# Patient Record
Sex: MALE | Race: WHITE | NOT HISPANIC OR LATINO | Employment: FULL TIME | URBAN - METROPOLITAN AREA
[De-identification: names, ages, dates, MRNs, and addresses within clinical notes are randomized per-mention and may not be internally consistent; named-entity substitution may affect disease eponyms.]

---

## 2017-01-03 ENCOUNTER — TELEPHONE (OUTPATIENT)
Dept: INTERNAL MEDICINE | Age: 62
End: 2017-01-03

## 2017-01-03 DIAGNOSIS — I10 ESSENTIAL HYPERTENSION: ICD-10-CM

## 2017-01-03 RX ORDER — AMLODIPINE BESYLATE 10 MG/1
10 TABLET ORAL DAILY
Qty: 30 TABLET | Refills: 2 | Status: SHIPPED | OUTPATIENT
Start: 2017-01-03 | End: 2017-03-29 | Stop reason: SDUPTHER

## 2017-01-03 NOTE — TELEPHONE ENCOUNTER
Rx sent to pharmacy. Pt needs to call pharmacy in future; the pharmacy can e-scribe the Rx request.    MALCOLM

## 2017-03-20 ENCOUNTER — OFFICE VISIT (OUTPATIENT)
Dept: INTERNAL MEDICINE | Age: 62
End: 2017-03-20

## 2017-03-20 VITALS
BODY MASS INDEX: 30.6 KG/M2 | OXYGEN SATURATION: 97 % | WEIGHT: 238.4 LBS | SYSTOLIC BLOOD PRESSURE: 128 MMHG | HEART RATE: 74 BPM | DIASTOLIC BLOOD PRESSURE: 68 MMHG | HEIGHT: 74 IN | TEMPERATURE: 97.7 F

## 2017-03-20 DIAGNOSIS — E66.9 OBESITY (BMI 30-39.9): ICD-10-CM

## 2017-03-20 DIAGNOSIS — I10 ESSENTIAL HYPERTENSION: Primary | ICD-10-CM

## 2017-03-20 PROCEDURE — 99213 OFFICE O/P EST LOW 20 MIN: CPT | Performed by: INTERNAL MEDICINE

## 2017-03-20 NOTE — PROGRESS NOTES
"Miles Pierre / 61 y.o. / male  03/20/2017    VITALS    Visit Vitals   • /68   • Pulse 74   • Temp 97.7 °F (36.5 °C)   • Ht 74\" (188 cm)   • Wt 238 lb 6.4 oz (108 kg)   • SpO2 97%   • BMI 30.61 kg/m2     BP Readings from Last 3 Encounters:   03/20/17 128/68   12/14/16 140/86   12/01/16 (!) 184/98     Wt Readings from Last 3 Encounters:   03/20/17 238 lb 6.4 oz (108 kg)   12/14/16 242 lb 8 oz (110 kg)   12/01/16 240 lb (109 kg)      Body mass index is 30.61 kg/(m^2).    CC:  Main reason(s) for today's visit: Hypertension      HPI:     Patient presents for four-month follow-up.  Last seen by me during November of last year for his annual physical.  Laboratory results are normal and are noted below.    Hypertension: He is compliant with medication,  salt irene, does not regularly monitor blood pressure the outpatient setting and does walk on a fairly regular basis 2-3 miles 4-5 times per week.    Weight control: Patient has lost 4 pounds since our last visit.    ______________________________________________________    ASSESSMENT & PLAN:    1. Essential hypertension    2. Obesity (BMI 30-39.9)      No orders of the defined types were placed in this encounter.      Summary/Discussion:     · 1 hypertension stable on current medical regimen.  Plan: Same meds, blood pressure check 4 months.  ·   · #2 weight control, needs improvement, agree with patient's walking plans now that the weather as moderate and  time change has occurred.      Return in about 4 months (around 7/20/2017) for Blood pressure check.    Future Appointments  Date Time Provider Department Center   7/24/2017 1:40 PM MD [facility] None       ______________________________________________________    REVIEW OF SYSTEMS    Review of Systems   Constitutional: Negative for appetite change, chills, diaphoresis, fatigue and fever.   HENT: Negative for hearing loss and trouble swallowing.    Eyes: Negative for visual " disturbance.   Respiratory: Negative for cough, chest tightness, shortness of breath and wheezing.    Cardiovascular: Negative for chest pain, palpitations and leg swelling.   Gastrointestinal: Negative for abdominal pain, constipation, diarrhea, nausea and vomiting.   Endocrine: Negative for cold intolerance, heat intolerance, polydipsia, polyphagia and polyuria.   Genitourinary: Negative for dysuria, frequency and hematuria.   Musculoskeletal: Negative for joint swelling and myalgias.   Skin: Negative for color change and rash.   Allergic/Immunologic: Negative for immunocompromised state.   Neurological: Negative for dizziness, syncope, speech difficulty, weakness, light-headedness, numbness and headaches.   Hematological: Negative for adenopathy. Does not bruise/bleed easily.   Psychiatric/Behavioral: Negative.          PHYSICAL EXAMINATION    Physical Exam   Constitutional: He is oriented to person, place, and time. He appears well-developed and well-nourished. No distress.   Cardiovascular: Normal rate, regular rhythm, normal heart sounds and intact distal pulses.  Exam reveals no gallop and no friction rub.    No murmur heard.  Pulmonary/Chest: Effort normal and breath sounds normal. He has no wheezes. He has no rales.   Musculoskeletal: He exhibits no edema.   Neurological: He is alert and oriented to person, place, and time.   Skin: Skin is warm and dry. No rash noted.   Psychiatric: He has a normal mood and affect. His behavior is normal. Judgment and thought content normal.   Nursing note and vitals reviewed.      REVIEWED DATA:    Labs:   Lab Results   Component Value Date     12/14/2016    K 4.9 12/14/2016    AST 65 (H) 12/14/2016    ALT 55 (H) 12/14/2016    BUN 15 12/14/2016    CREATININE 0.80 12/14/2016    CREATININE 0.66 (L) 12/01/2016    CREATININE 0.87 09/04/2015    EGFRIFNONA 96 12/14/2016    EGFRIFAFRI 111 12/14/2016       Lab Results   Component Value Date     (H) 12/14/2016    GLU  87 12/01/2016     (H) 09/04/2015    HGBA1C 5.5 09/04/2015       Lab Results   Component Value Date     (H) 12/14/2016    HDL 55 12/14/2016    TRIG 103 12/14/2016       No results found for: TSH, FREET4       Lab Results   Component Value Date    WBC 5.8 12/14/2016    HGB 15.0 12/14/2016     12/14/2016        Imaging:        Medical Tests:        Summary of old records / correspondence / consultant report:        Request outside records:          ALLERGIES:    Review of patient's allergies indicates no known allergies.    MEDICATIONS:       Current Outpatient Prescriptions   Medication Sig Dispense Refill   • amLODIPine (NORVASC) 10 MG tablet Take 1 tablet by mouth Daily. 30 tablet 2     No current facility-administered medications for this visit.        Count [facility] the [facility]    The following portions of the patient's history were reviewed and updated as appropriate: Allergies / Current Medications / Past Medical History / Surgical History / Social History / Family History    PROBLEM LIST:    Patient Active Problem List   Diagnosis   • Impaired fasting glucose   • Hypertension   • Hyperlipidemia   • Impotence of organic origin   • Osteoarthritis of knee   • Tear of medial meniscus of knee   • Obesity (BMI 30-39.9)   • Routine health maintenance   • Nocturia   • Abnormal laboratory test       PAST MEDICAL HX:    Past Medical History   Diagnosis Date   • ED (erectile dysfunction)    • Hyperlipidemia    • Hypertension        PAST SURGICAL HX:    Past Surgical History   Procedure Laterality Date   • No past surgeries         SOCIAL HX:    Social History     Social History   • Marital status: Single     Spouse name: N/A   • Number of children: N/A   • Years of education: N/A     Occupational History   • 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      Social History Main Topics   • Smoking status: Former Smoker     Packs/day: 0.50     Years: 23.00     Types: Cigars     Start date: 1968     Quit date: 1991   • Smokeless  tobacco: Never Used   • Alcohol use No   • Drug use: None   • Sexual activity: No     Other Topics Concern   • None     Social History Narrative    Single       FAMILY HX:    Family History   Problem Relation Age of Onset   • Hypertension Father    • Hypertension Brother          **Moose Disclaimer:   Much of this encounter note is an electronic transcription/translation of spoken language to printed text. The electronic translation of spoken language may permit erroneous, or at times, nonsensical words or phrases to be inadvertently transcribed. Although I have reviewed the note for such errors, some may still exist.

## 2017-03-29 DIAGNOSIS — I10 ESSENTIAL HYPERTENSION: ICD-10-CM

## 2017-03-29 RX ORDER — AMLODIPINE BESYLATE 10 MG/1
TABLET ORAL
Qty: 30 TABLET | Refills: 5 | Status: SHIPPED | OUTPATIENT
Start: 2017-03-29 | End: 2017-08-02 | Stop reason: SDUPTHER

## 2017-07-24 ENCOUNTER — OFFICE VISIT (OUTPATIENT)
Dept: INTERNAL MEDICINE | Age: 62
End: 2017-07-24

## 2017-07-24 VITALS
OXYGEN SATURATION: 96 % | WEIGHT: 231.4 LBS | HEIGHT: 74 IN | SYSTOLIC BLOOD PRESSURE: 128 MMHG | DIASTOLIC BLOOD PRESSURE: 68 MMHG | HEART RATE: 66 BPM | TEMPERATURE: 98.6 F | BODY MASS INDEX: 29.7 KG/M2

## 2017-07-24 DIAGNOSIS — I10 ESSENTIAL HYPERTENSION: Primary | ICD-10-CM

## 2017-07-24 DIAGNOSIS — R73.01 IMPAIRED FASTING GLUCOSE: ICD-10-CM

## 2017-07-24 DIAGNOSIS — G47.30 SLEEP APNEA, UNSPECIFIED TYPE: ICD-10-CM

## 2017-07-24 DIAGNOSIS — E66.9 OBESITY (BMI 30-39.9): ICD-10-CM

## 2017-07-24 PROCEDURE — 99214 OFFICE O/P EST MOD 30 MIN: CPT | Performed by: INTERNAL MEDICINE

## 2017-07-24 NOTE — PROGRESS NOTES
"Miles Pierre / 61 y.o. / male  07/24/2017  VITALS    /68  Pulse 66  Temp 98.6 °F (37 °C)  Ht 74\" (188 cm)  Wt 231 lb 6.4 oz (105 kg)  SpO2 96%  BMI 29.71 kg/m2  BP Readings from Last 3 Encounters:   07/24/17 128/68   03/20/17 128/68   12/14/16 140/86     Wt Readings from Last 3 Encounters:   07/24/17 231 lb 6.4 oz (105 kg)   03/20/17 238 lb 6.4 oz (108 kg)   12/14/16 242 lb 8 oz (110 kg)      Body mass index is 29.71 kg/(m^2).    CC:  Main reason(s) for today's visit: Hypertension      HPI:     Patient presents today for follow-up of several medical issues.  His last seen by me approximately 4 months ago which time his blood pressure is well-controlled at 120/68.    Today he is compliant with medication, regular physical activity, home blood pressure monitoring (blood pressure typically runs in the 140s over 70s range), and he does not regularly salt his food.  There are no medication side effects noted.    Weight control: Patient is lost 7 pounds since our last visit.    Impaired glucose tolerance was noted last lab test, he is not fasting today but he can suggest an A1c which will tell us the level of his blood sugar control is a 10 weeks.    In addition to this he notes that he feels\"disjointed\" over the past month or so.  He also acknowledges some daytime sedation, occasional a.m. headache, he has been noted to snore during the evening, he also notes some daytime sedation in boring meetings.  He acknowledges that he feels moderately rested upon awakening.    Patient Care Team:  Cliff Parekh MD as PCP - General (Internal Medicine)    ____________________________________________________________________    ASSESSMENT & PLAN:    Problem List Items Addressed This Visit        Unprioritized    Impaired fasting glucose    Relevant Orders    Hemoglobin A1c    Hypertension - Primary    Relevant Medications    amLODIPine (NORVASC) 10 MG tablet    Obesity (BMI 30-39.9)      Other Visit Diagnoses     " Sleep apnea, unspecified type        Relevant Orders    Polysomnography 4 or More Parameters        Orders Placed This Encounter   Procedures   • Hemoglobin A1c   • Polysomnography 4 or More Parameters       Summary/Discussion:     · Number-one hypertension stable on current medical regimen.  Plan: Same meds, blood pressure check 4 months.  ·   · 2 weight control, encouraged patient's effort to continue with weight loss as above.  ·   · #3 history of impaired glucose tolerance, we will check A1c today.  ·   · #4 symptoms of malaise are consistent without sleep apnea.  We'll schedule polysomnography and treat accordingly.      Return in about 4 months (around 11/24/2017) for Blood pressure check.    No future appointments.    ______________________________________________________    REVIEW OF SYSTEMS    Review of Systems   Respiratory: Negative for chest tightness and shortness of breath.    Cardiovascular: Negative for chest pain and palpitations.   Neurological: Negative for dizziness, syncope, speech difficulty, weakness, light-headedness and headaches.         PHYSICAL EXAMINATION    Physical Exam   Constitutional: He is oriented to person, place, and time. He appears well-developed. No distress.   o/w   Cardiovascular: Normal rate, regular rhythm, normal heart sounds and intact distal pulses.  Exam reveals no gallop and no friction rub.    No murmur heard.  Pulmonary/Chest: Effort normal and breath sounds normal. He has no wheezes. He has no rales.   Musculoskeletal: He exhibits no edema.   Neurological: He is alert and oriented to person, place, and time.   Skin: Skin is warm and dry. No rash noted.   Psychiatric: He has a normal mood and affect. His behavior is normal. Judgment and thought content normal.   Nursing note and vitals reviewed.      REVIEWED DATA:    Labs:   Lab Results   Component Value Date     12/14/2016    K 4.9 12/14/2016    AST 65 (H) 12/14/2016    ALT 55 (H) 12/14/2016    BUN 15  12/14/2016    CREATININE 0.80 12/14/2016    CREATININE 0.66 (L) 12/01/2016    CREATININE 0.87 09/04/2015    EGFRIFNONA 96 12/14/2016    EGFRIFAFRI 111 12/14/2016          Lab Results   Component Value Date    HGBA1C 5.5 09/04/2015     (H) 12/14/2016    GLU 87 12/01/2016     (H) 09/04/2015       Lab Results   Component Value Date     (H) 12/14/2016    HDL 55 12/14/2016    TRIG 103 12/14/2016       No results found for: TSH, FREET4       Lab Results   Component Value Date    WBC 5.8 12/14/2016    HGB 15.0 12/14/2016     12/14/2016        Imaging:        Medical Tests:        Summary of old records / correspondence / consultant report:        Request outside records:        No Known Allergies    Current Outpatient Prescriptions   Medication Sig Dispense Refill   • amLODIPine (NORVASC) 10 MG tablet TAKE ONE TABLET BY MOUTH ONCE DAILY 30 tablet 5     No current facility-administered medications for this visit.        PFSH:     The following portions of the patient's history were reviewed and updated as appropriate: Allergies / Current Medications / Past Medical History / Surgical History / Social History / Family History    Patient Active Problem List   Diagnosis   • Impaired fasting glucose   • Hypertension   • Hyperlipidemia   • Impotence of organic origin   • Osteoarthritis of knee   • Tear of medial meniscus of knee   • Obesity (BMI 30-39.9)   • Routine health maintenance   • Nocturia   • Abnormal laboratory test       Past Medical History:   Diagnosis Date   • ED (erectile dysfunction)    • Hyperlipidemia    • Hypertension    • Skin cancer 05/2017    Basal cell carcinoma       Past Surgical History:   Procedure Laterality Date   • NO PAST SURGERIES     • SKIN CANCER EXCISION      basal cell       Social History     Social History   • Marital status: Single     Spouse name: N/A   • Number of children: 3   • Years of education: N/A     Occupational History   • [employer]  manufacturers rep  electrical      Social History Main Topics   • Smoking status: Former Smoker     Packs/day: 0.50     Years: 23.00     Types: Cigars     Start date: 1968     Quit date: 1991   • Smokeless tobacco: Never Used   • Alcohol use No   • Drug use: None   • Sexual activity: No     Other Topics Concern   • None     Social History Narrative    Single       Family History   Problem Relation Age of Onset   • Hypertension Father    • Hypertension Brother          **Moose Disclaimer:   Much of this encounter note is an electronic transcription/translation of spoken language to printed text. The electronic translation of spoken language may permit erroneous, or at times, nonsensical words or phrases to be inadvertently transcribed. Although I have reviewed the note for such errors, some may still exist.

## 2017-07-25 LAB — HBA1C MFR BLD: 6 % (ref 4.8–5.6)

## 2017-08-02 ENCOUNTER — TELEPHONE (OUTPATIENT)
Dept: INTERNAL MEDICINE | Age: 62
End: 2017-08-02

## 2017-08-02 DIAGNOSIS — I10 ESSENTIAL HYPERTENSION: ICD-10-CM

## 2017-08-02 RX ORDER — AMLODIPINE BESYLATE 10 MG/1
10 TABLET ORAL DAILY
Qty: 30 TABLET | Refills: 0 | Status: SHIPPED | OUTPATIENT
Start: 2017-08-02 | End: 2017-08-31 | Stop reason: SDUPTHER

## 2017-08-02 NOTE — TELEPHONE ENCOUNTER
P/pt took his last amlodipine 10mg pill yesterday-Tuesday 8/1/17 & is now OUT. Pt is traveling currently & has requested a 30 day rx refill of amlodipine 10mg to be called into [facility] in [place], [place] #[phone].    Pt can be reached #[phone].

## 2017-08-30 ENCOUNTER — OFFICE VISIT (OUTPATIENT)
Dept: INTERNAL MEDICINE | Age: 62
End: 2017-08-30

## 2017-08-30 VITALS
HEART RATE: 92 BPM | SYSTOLIC BLOOD PRESSURE: 158 MMHG | WEIGHT: 232 LBS | BODY MASS INDEX: 29.77 KG/M2 | HEIGHT: 74 IN | TEMPERATURE: 98 F | OXYGEN SATURATION: 99 % | DIASTOLIC BLOOD PRESSURE: 84 MMHG

## 2017-08-30 DIAGNOSIS — R53.83 FATIGUE, UNSPECIFIED TYPE: Primary | ICD-10-CM

## 2017-08-30 DIAGNOSIS — R74.8 ELEVATED LIVER ENZYMES: ICD-10-CM

## 2017-08-30 DIAGNOSIS — I10 ESSENTIAL HYPERTENSION: ICD-10-CM

## 2017-08-30 LAB
ALBUMIN SERPL-MCNC: 4.8 G/DL (ref 3.5–5.2)
ALBUMIN/GLOB SERPL: 1.7 G/DL
ALP SERPL-CCNC: 102 U/L (ref 39–117)
ALT SERPL-CCNC: 45 U/L (ref 1–41)
AST SERPL-CCNC: 49 U/L (ref 1–40)
BASOPHILS # BLD AUTO: 0.01 10*3/MM3 (ref 0–0.2)
BASOPHILS NFR BLD AUTO: 0.2 % (ref 0–1.5)
BILIRUB SERPL-MCNC: 1.6 MG/DL (ref 0.1–1.2)
BUN SERPL-MCNC: 16 MG/DL (ref 8–23)
BUN/CREAT SERPL: 19.3 (ref 7–25)
CALCIUM SERPL-MCNC: 9.6 MG/DL (ref 8.6–10.5)
CHLORIDE SERPL-SCNC: 103 MMOL/L (ref 98–107)
CO2 SERPL-SCNC: 27.2 MMOL/L (ref 22–29)
CREAT SERPL-MCNC: 0.83 MG/DL (ref 0.76–1.27)
EOSINOPHIL # BLD AUTO: 0.1 10*3/MM3 (ref 0–0.7)
EOSINOPHIL NFR BLD AUTO: 2 % (ref 0.3–6.2)
ERYTHROCYTE [DISTWIDTH] IN BLOOD BY AUTOMATED COUNT: 13.3 % (ref 11.5–14.5)
GLOBULIN SER CALC-MCNC: 2.8 GM/DL
GLUCOSE SERPL-MCNC: 154 MG/DL (ref 65–99)
HCT VFR BLD AUTO: 43.4 % (ref 40.4–52.2)
HGB BLD-MCNC: 14 G/DL (ref 13.7–17.6)
IMM GRANULOCYTES # BLD: 0 10*3/MM3 (ref 0–0.03)
IMM GRANULOCYTES NFR BLD: 0 % (ref 0–0.5)
LYMPHOCYTES # BLD AUTO: 1.62 10*3/MM3 (ref 0.9–4.8)
LYMPHOCYTES NFR BLD AUTO: 32.1 % (ref 19.6–45.3)
MCH RBC QN AUTO: 29.9 PG (ref 27–32.7)
MCHC RBC AUTO-ENTMCNC: 32.3 G/DL (ref 32.6–36.4)
MCV RBC AUTO: 92.5 FL (ref 79.8–96.2)
MONOCYTES # BLD AUTO: 0.35 10*3/MM3 (ref 0.2–1.2)
MONOCYTES NFR BLD AUTO: 6.9 % (ref 5–12)
NEUTROPHILS # BLD AUTO: 2.96 10*3/MM3 (ref 1.9–8.1)
NEUTROPHILS NFR BLD AUTO: 58.8 % (ref 42.7–76)
PLATELET # BLD AUTO: 237 10*3/MM3 (ref 140–500)
POTASSIUM SERPL-SCNC: 5.1 MMOL/L (ref 3.5–5.2)
PROT SERPL-MCNC: 7.6 G/DL (ref 6–8.5)
RBC # BLD AUTO: 4.69 10*6/MM3 (ref 4.6–6)
SODIUM SERPL-SCNC: 141 MMOL/L (ref 136–145)
T4 FREE SERPL-MCNC: 1.09 NG/DL (ref 0.93–1.7)
TSH SERPL DL<=0.005 MIU/L-ACNC: 1.24 MIU/ML (ref 0.27–4.2)
WBC # BLD AUTO: 5.04 10*3/MM3 (ref 4.5–10.7)

## 2017-08-30 PROCEDURE — 99214 OFFICE O/P EST MOD 30 MIN: CPT | Performed by: NURSE PRACTITIONER

## 2017-08-30 NOTE — PATIENT INSTRUCTIONS
CONTINUE TO MONITOR BLOOD PRESSURES TWICE DAILY AT HOME AND RECORD. CALL MY OFFICE IN ABOUT 1 WEEK TO LET ME KNOW WHAT READINGS HAVE BEEN.     FOLLOW UP WITH DR. CENTENO OR MYSELF AFTER SLEEP STUDY DONE IF STILL HAVING SYMPTOMS.       Hypertension  Hypertension, commonly called high blood pressure, is when the force of blood pumping through your arteries is too strong. Your arteries are the blood vessels that carry blood from your heart throughout your body. A blood pressure reading consists of a higher number over a lower number, such as 110/72. The higher number (systolic) is the pressure inside your arteries when your heart pumps. The lower number (diastolic) is the pressure inside your arteries when your heart relaxes. Ideally you want your blood pressure below 120/80.  Hypertension forces your heart to work harder to pump blood. Your arteries may become narrow or stiff. Having untreated or uncontrolled hypertension can cause heart attack, stroke, kidney disease, and other problems.  RISK FACTORS  Some risk factors for high blood pressure are controllable. Others are not.   Risk factors you cannot control include:   · Race. You may be at higher risk if you are .  · Age. Risk increases with age.  · Gender. Men are at higher risk than women before age 45 years. After age 65, women are at higher risk than men.  Risk factors you can control include:  · Not getting enough exercise or physical activity.  · Being overweight.  · Getting too much fat, sugar, calories, or salt in your diet.  · Drinking too much alcohol.  SIGNS AND SYMPTOMS  Hypertension does not usually cause signs or symptoms. Extremely high blood pressure (hypertensive crisis) may cause headache, anxiety, shortness of breath, and nosebleed.  DIAGNOSIS  To check if you have hypertension, your health care provider will measure your blood pressure while you are seated, with your arm held at the level of your heart. It should be measured at  least twice using the same arm. Certain conditions can cause a difference in blood pressure between your right and left arms. A blood pressure reading that is higher than normal on one occasion does not mean that you need treatment. If it is not clear whether you have high blood pressure, you may be asked to return on a different day to have your blood pressure checked again. Or, you may be asked to monitor your blood pressure at home for 1 or more weeks.  TREATMENT  Treating high blood pressure includes making lifestyle changes and possibly taking medicine. Living a healthy lifestyle can help lower high blood pressure. You may need to change some of your habits.  Lifestyle changes may include:  · Following the DASH diet. This diet is high in fruits, vegetables, and whole grains. It is low in salt, red meat, and added sugars.  · Keep your sodium intake below 2,300 mg per day.  · Getting at least 30-45 minutes of aerobic exercise at least 4 times per week.  · Losing weight if necessary.  · Not smoking.  · Limiting alcoholic beverages.  · Learning ways to reduce stress.  Your health care provider may prescribe medicine if lifestyle changes are not enough to get your blood pressure under control, and if one of the following is true:  · You are 18-59 years of age and your systolic blood pressure is above 140.  · You are 60 years of age or older, and your systolic blood pressure is above 150.  · Your diastolic blood pressure is above 90.  · You have diabetes, and your systolic blood pressure is over 140 or your diastolic blood pressure is over 90.  · You have kidney disease and your blood pressure is above 140/90.  · You have heart disease and your blood pressure is above 140/90.  Your personal target blood pressure may vary depending on your medical conditions, your age, and other factors.  HOME CARE INSTRUCTIONS  · Have your blood pressure rechecked as directed by your health care provider.    · Take medicines only as  directed by your health care provider. Follow the directions carefully. Blood pressure medicines must be taken as prescribed. The medicine does not work as well when you skip doses. Skipping doses also puts you at risk for problems.  · Do not smoke.    · Monitor your blood pressure at home as directed by your health care provider.   SEEK MEDICAL CARE IF:   · You think you are having a reaction to medicines taken.  · You have recurrent headaches or feel dizzy.  · You have swelling in your ankles.  · You have trouble with your vision.  SEEK IMMEDIATE MEDICAL CARE IF:  · You develop a severe headache or confusion.  · You have unusual weakness, numbness, or feel faint.  · You have severe chest or abdominal pain.  · You vomit repeatedly.  · You have trouble breathing.  MAKE SURE YOU:   · Understand these instructions.  · Will watch your condition.  · Will get help right away if you are not doing well or get worse.     This information is not intended to replace advice given to you by your health care provider. Make sure you discuss any questions you have with your health care provider.     Document Released: 12/18/2006 Document Revised: 05/03/2016 Document Reviewed: 10/10/2014  [employer] Interactive Patient Education ©2017 [employer]

## 2017-08-30 NOTE — PROGRESS NOTES
"Subjective   Miles Pierre is a 61 y.o. male.     Hypertension   This is a chronic problem. Pertinent negatives include no anxiety, blurred vision, chest pain, headaches, malaise/fatigue, neck pain, orthopnea, palpitations, peripheral edema, PND, shortness of breath or sweats. (Reports feeling \"off\".  He does have complaints of fatigue, usually worse as the day progresses.  Denies any excessive fatigue upon awakening, although he does report that he feels \"off\" most frequently in the mornings and attributes this to his blood pressure.) There are no associated agents to hypertension. Risk factors for coronary artery disease include male gender, smoking/tobacco exposure and sedentary lifestyle. Past treatments include calcium channel blockers. Improvement on treatment: 140/90s intermittently at home. There are no compliance problems.     For the most part, he has been well controlled on amlodipine 10 mg daily.  Reports his blood pressures have only been elevated for the past few days, systolic usually less than 150 and diastolic occasionally over 90.  He has previously seen Dr. Parekh for this same complaint at his last check, sleep study was ordered at that time which patient is not yet completed.  He is scheduled to have it done on 9/6/17, although he reports he will not be what to make this appointment due to work issues.    The following portions of the patient's history were reviewed and updated as appropriate: allergies, current medications, past family history, past medical history, past social history, past surgical history and problem list.    Review of Systems   Constitutional: Positive for fatigue. Negative for chills, fever and malaise/fatigue.   HENT: Negative for congestion and tinnitus.    Eyes: Negative for blurred vision and visual disturbance.   Respiratory: Negative for shortness of breath.    Cardiovascular: Negative for chest pain, palpitations, orthopnea and PND.   Gastrointestinal: Negative " "for abdominal pain, nausea and vomiting.   Endocrine: Negative for polydipsia, polyphagia and polyuria.   Genitourinary: Negative for flank pain.   Musculoskeletal: Negative for neck pain.   Neurological: Negative for dizziness, light-headedness and headaches.       Objective   Physical Exam   Constitutional: He is oriented to person, place, and time. Vital signs are normal. He appears well-developed and well-nourished. He is cooperative. He does not appear ill. No distress.   Cardiovascular: Normal rate, regular rhythm, S1 normal, S2 normal and normal heart sounds.    No murmur heard.  Pulmonary/Chest: Effort normal and breath sounds normal. He has no decreased breath sounds. He has no wheezes. He has no rhonchi. He has no rales.   Neurological: He is alert and oriented to person, place, and time.   Skin: Skin is warm, dry and intact.   Psychiatric: He has a normal mood and affect. His speech is normal and behavior is normal. Judgment and thought content normal. Cognition and memory are normal.   Nursing note and vitals reviewed.      Assessment/Plan   Problems Addressed this Visit        Cardiovascular and Mediastinum    Hypertension      Other Visit Diagnoses     Fatigue, unspecified type    -  Primary    Relevant Orders    CBC & Differential    Comprehensive Metabolic Panel    TSH+Free T4    Elevated liver enzymes        Relevant Orders    Comprehensive Metabolic Panel        1. Fatigue, unspecified type  Patient with persistent complaints of feeling \"off \" for the past month or so, which he attributes to his blood pressure.  Blood pressure today is slightly elevated.  He has been checking blood pressure at home periodically, but it is not been consistently elevated with diastolic over 90.  He does have sleep study scheduled for next week, which could be contributed to poor a to his fatigue and recent elevated blood pressures.  At this time, I do not think it is appropriate to alter or add to his current blood " pressure medication regimen until we know results of sleep study.  I did instruct patient to continue to check blood pressures twice daily at home and record, get in contact with me in approximately 1 week regarding readings.  I am also going to obtain some baseline lab work today as he has not had labs performed in approximately 9 months.    - CBC & Differential  - Comprehensive Metabolic Panel  - TSH+Free T4    2. Essential hypertension      3. Elevated liver enzymes  Review of chart demonstrates that he did have some elevated ALT and AST levels back in December, which has not been rechecked since then.  I'm going to obtain CMP today (nonfasting) to evaluate current levels.  Denies any complaints of nausea, vomiting, abdominal pain.    - Comprehensive Metabolic Panel

## 2017-08-31 ENCOUNTER — TELEPHONE (OUTPATIENT)
Dept: INTERNAL MEDICINE | Age: 62
End: 2017-08-31

## 2017-08-31 DIAGNOSIS — I10 ESSENTIAL HYPERTENSION: ICD-10-CM

## 2017-08-31 RX ORDER — AMLODIPINE BESYLATE 10 MG/1
10 TABLET ORAL DAILY
Qty: 30 TABLET | Refills: 0 | Status: SHIPPED | OUTPATIENT
Start: 2017-08-31 | End: 2017-10-02 | Stop reason: SDUPTHER

## 2017-09-01 ENCOUNTER — TELEPHONE (OUTPATIENT)
Dept: INTERNAL MEDICINE | Age: 62
End: 2017-09-01

## 2017-09-01 NOTE — TELEPHONE ENCOUNTER
----- Message from ZOE Hoffman sent at 8/31/2017  7:46 AM EDT -----  Please call patient with results and send result letter to home address.    Your CBC looks normal, no sign of anemia, platelet disorder, or obvious infection.   CMP shows that your liver enzymes are still slightly elevated, but improved since previous lab values.  Please refrain from taking any Tylenol or Tylenol containing products, reduce any alcohol intake.  Your tests for thyroid function are normal.   The results that we obtained are not significant for be in any cause of your symptoms.  I would still continue to monitor blood pressure like we discussed and have your sleep study done as I feel that this may account for most of your symptoms.  Please feel free to call the office or contact me through [employer] with any questions or concerns.     Rowena ENRIQUEZ

## 2017-09-01 NOTE — TELEPHONE ENCOUNTER
Left VM for pt regarding lab results. Pt was informed of all normal results. Pt was informed that liver enzymes are elevated; pt was advised to avoid Tylenol and Tylenol-containing products, and to reduce alcohol intake. Pt was advised to continue home BP checks and to go ahead with sleep study, per ZOE Guaman.     KD

## 2017-09-06 ENCOUNTER — APPOINTMENT (OUTPATIENT)
Dept: SLEEP MEDICINE | Facility: HOSPITAL | Age: 62
End: 2017-09-06

## 2017-09-12 ENCOUNTER — HOSPITAL ENCOUNTER (OUTPATIENT)
Dept: SLEEP MEDICINE | Facility: HOSPITAL | Age: 62
Discharge: HOME | End: 2017-09-12
Admitting: INTERNAL MEDICINE

## 2017-09-12 DIAGNOSIS — G47.10 HYPERSOMNIA: ICD-10-CM

## 2017-09-12 DIAGNOSIS — G47.33 OSA (OBSTRUCTIVE SLEEP APNEA): Primary | ICD-10-CM

## 2017-09-12 PROCEDURE — G0463 HOSPITAL OUTPT CLINIC VISIT: HCPCS

## 2017-09-12 NOTE — PROGRESS NOTES
Sleep Medicine initial Consultation    Miles Pierre  : 1955  61 y.o. male   Date of Service: 2017  Referring provider: Duarte Maloney MD    History Of Present Illness:   Mr. Miles Pierre  is a 61 y.o. patient is here for the evaluation of AUDRA and sleepiness.    The patient c/o excessive day time sleepiness, fatigue, can easily fall asleep in a quiet environment.  No history of hypnagogic hallucinations or cataplexy or sleep paralysis.    The patient denies any snoring or apneas.     The patient denies moves or jerks during sleep.    The patient denies any significant insomnia.    Sleep schedule: Bedtime:10-11 p.m. , gets out of bed at 7 AM, sleep latency: 10-15 minutes, Gets about 7 hours of sleep.    [place] Sleepiness Scale score: 12/24 suggesting significant hypersomnia.    Past Medical History:   Diagnosis Date   • ED (erectile dysfunction)    • Hyperlipidemia    • Hypertension    • Skin cancer 2017    Basal cell carcinoma     Past Surgical History:   Procedure Laterality Date   • NO PAST SURGERIES     • SKIN CANCER EXCISION      basal cell     Current Outpatient Prescriptions on File Prior to Encounter   Medication Sig Dispense Refill   • amLODIPine (NORVASC) 10 MG tablet Take 1 tablet by mouth Daily. 30 tablet 0     No current facility-administered medications on file prior to encounter.      No Known Allergies  Family History   Problem Relation Age of Onset   • Hypertension Father    • Hypertension Brother      Social History     Social History   • Marital status: Single     Spouse name: N/A   • Number of children: 3   • Years of education: N/A     Occupational History   • 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      Social History Main Topics   • Smoking status: Former Smoker     Packs/day: 0.50     Years: 23.00     Types: Cigars     Start date:      Quit date:    • Smokeless tobacco: Never Used   • Alcohol use No   • Drug use: Not on file   • Sexual activity: No     Other  Topics Concern   • Not on file     Social History Narrative    Single   Caffeine intake one cup of coffee daily.     Review of Systems - Negative except frequent urination, frequent heartburn and chronic anxiety.    Patient examination:  Vital signs: Weight 230 LBS, Height 75 inches, BMI 29, neck collar size: 16.5 inches, /86, Pulse 66/minute.  HEENT: Normal and Mallampati class III.  Neck: Supple no carotid bruits.  Lungs: Clear to auscultation.  Cardiac exam: Normal and regular rate and rhythm. No murmurs.  Extremities: No edema clubbing or cyanosis.    ASSESSMENT AND PLAN:The patient has symptoms of obstructive sleep apnea syndrome with hypersomnia.  We will proceed with polysomnography and treated with CPAP therapy if needed.  Sleep hygiene techniques discussed.  Exercise diet and weight loss discussed.    Veronica Ng MD  9/12/2017  11:44 AM

## 2017-10-02 DIAGNOSIS — I10 ESSENTIAL HYPERTENSION: ICD-10-CM

## 2017-10-02 RX ORDER — AMLODIPINE BESYLATE 10 MG/1
TABLET ORAL
Qty: 30 TABLET | Refills: 0 | Status: SHIPPED | OUTPATIENT
Start: 2017-10-02 | End: 2017-11-06 | Stop reason: SDUPTHER

## 2017-10-09 ENCOUNTER — HOSPITAL ENCOUNTER (OUTPATIENT)
Dept: SLEEP MEDICINE | Facility: HOSPITAL | Age: 62
Discharge: HOME | End: 2017-10-09
Admitting: PSYCHIATRY & NEUROLOGY

## 2017-10-09 DIAGNOSIS — G47.10 HYPERSOMNIA: ICD-10-CM

## 2017-10-09 DIAGNOSIS — G47.33 OSA (OBSTRUCTIVE SLEEP APNEA): ICD-10-CM

## 2017-10-09 PROCEDURE — 95806 SLEEP STUDY UNATT&RESP EFFT: CPT

## 2017-11-06 DIAGNOSIS — I10 ESSENTIAL HYPERTENSION: ICD-10-CM

## 2017-11-06 RX ORDER — AMLODIPINE BESYLATE 10 MG/1
10 TABLET ORAL DAILY
Qty: 90 TABLET | Refills: 0 | Status: SHIPPED | OUTPATIENT
Start: 2017-11-06 | End: 2018-01-29 | Stop reason: SDUPTHER

## 2017-11-27 ENCOUNTER — OFFICE VISIT (OUTPATIENT)
Dept: INTERNAL MEDICINE | Age: 62
End: 2017-11-27

## 2017-11-27 VITALS
HEIGHT: 74 IN | BODY MASS INDEX: 30.03 KG/M2 | DIASTOLIC BLOOD PRESSURE: 75 MMHG | WEIGHT: 234 LBS | TEMPERATURE: 97.9 F | OXYGEN SATURATION: 96 % | SYSTOLIC BLOOD PRESSURE: 140 MMHG | HEART RATE: 76 BPM

## 2017-11-27 DIAGNOSIS — Z00.00 ROUTINE HEALTH MAINTENANCE: ICD-10-CM

## 2017-11-27 DIAGNOSIS — R35.1 NOCTURIA: ICD-10-CM

## 2017-11-27 DIAGNOSIS — E66.9 OBESITY (BMI 30-39.9): ICD-10-CM

## 2017-11-27 DIAGNOSIS — I10 ESSENTIAL HYPERTENSION: Primary | ICD-10-CM

## 2017-11-27 PROBLEM — G47.39 OTHER SLEEP APNEA: Status: ACTIVE | Noted: 2017-11-27

## 2017-11-27 PROCEDURE — 99213 OFFICE O/P EST LOW 20 MIN: CPT | Performed by: INTERNAL MEDICINE

## 2017-11-27 NOTE — PROGRESS NOTES
"  Miles Pierre / 61 y.o. / male  11/27/2017  VITALS    /88  Pulse 76  Temp 97.9 °F (36.6 °C)  Ht 74\" (188 cm)  Wt 234 lb (106 kg)  SpO2 96%  BMI 30.04 kg/m2    BP Readings from Last 3 Encounters:   11/27/17 154/88   08/30/17 158/84   07/24/17 128/68     Wt Readings from Last 3 Encounters:   11/27/17 234 lb (106 kg)   08/30/17 232 lb (105 kg)   07/24/17 231 lb 6.4 oz (105 kg)      Body mass index is 30.04 kg/(m^2).    CC:  Main reason(s) for today's visit: Hypertension      HPI:     She presents today for follow-up of several medical issues.    Hypertension he is compliant with medication, dietary salt restriction, he does monitor blood pressure the home environment and typically runs in the 140s over 80s range.  There are no medication side effects noted he does walk for exercise 3-4 times per week.    Patient has not lost any significant weight since our last visit, he has lost about 20 pounds, approximate 5 years ago.  He is about to 28 at home prescription, and would like to get down to about 2:15, I concur, his ideal weights about 200 pounds.    Nocturia, patient notes dry mouth he does have a bottle water with him on his nightstand, and consequently is up 3-4 times a night to urinate.  During the day he has no problems with dryness.  He does not have a furnace humidifier, we did discuss the utility of having a humidifier  either for the home or bedroom.  He notes no straining.    He does have a CPAP device ready for pickup at a ventilator because of positive sleep apnea test.    Patient Care Team:  Cliff Parekh MD as PCP - General (Internal Medicine)  ____________________________________________________________________    ASSESSMENT & PLAN:    Problem List Items Addressed This Visit        Unprioritized    Hypertension - Primary    Relevant Medications    amLODIPine (NORVASC) 10 MG tablet    Obesity (BMI 30-39.9)        No orders of the defined types were placed in this " encounter.      Summary/Discussion:  · 1 hypertension stable on current medical regimen.  Plan: Same meds, blood pressure check 6 months.,  Schedule fasting lipids.  ·   · #2 weight control, patient is aware of the need to lose another 10-15 pounds.  ·   · #3 nocturia, prostate is not enlarged on physical exam, I suspect that the fluid consumed during the night is the problem.  We'll also check urinalysis today to be sure there is no sign of infection.  Recommend humidifier in his room at night to eliminate the dryness, and then he can get rid of the water at night as well.      #4 routine health maintenance, patient is advised to discontinue smoking cigars.    No Follow-up on file.    Future Appointments  Date Time Provider Department Center   12/19/2017 9:15 AM MD LUPILLO Bang [facility] None       ______________________________________________________    REVIEW OF SYSTEMS    Review of Systems   Respiratory: Negative for chest tightness and shortness of breath.    Cardiovascular: Negative for chest pain and palpitations.   Neurological: Negative for dizziness, syncope, speech difficulty, weakness, light-headedness and headaches.     PHYSICAL EXAMINATION    Physical Exam   Constitutional: He is oriented to person, place, and time. He appears well-developed and well-nourished. No distress.   Cardiovascular: Normal rate, regular rhythm, normal heart sounds and intact distal pulses.  Exam reveals no gallop and no friction rub.    No murmur heard.  Pulmonary/Chest: Effort normal and breath sounds normal. He has no wheezes. He has no rales.   Genitourinary: Prostate is enlarged.   Genitourinary Comments: No nodules noted   Musculoskeletal: He exhibits no edema.   Neurological: He is alert and oriented to person, place, and time.   Skin: Skin is warm and dry. No rash noted.   Psychiatric: He has a normal mood and affect. His behavior is normal. Judgment and thought content normal.   Nursing note and vitals  reviewed.      REVIEWED DATA:    Labs:     Lab Results   Component Value Date     08/30/2017    K 5.1 08/30/2017    AST 49 (H) 08/30/2017    ALT 45 (H) 08/30/2017    BUN 16 08/30/2017    CREATININE 0.83 08/30/2017    CREATININE 0.80 12/14/2016    CREATININE 0.66 (L) 12/01/2016    EGFRIFNONA 94 08/30/2017    EGFRIFAFRI 113 08/30/2017       Lab Results   Component Value Date    HGBA1C 6.00 (H) 07/24/2017    HGBA1C 5.5 09/04/2015     (H) 08/30/2017     (H) 12/14/2016    GLU 87 12/01/2016       Lab Results   Component Value Date     (H) 12/14/2016    HDL 55 12/14/2016    TRIG 103 12/14/2016       Lab Results   Component Value Date    TSH 1.240 08/30/2017    FREET4 1.09 08/30/2017       Lab Results   Component Value Date    WBC 5.04 08/30/2017    HGB 14.0 08/30/2017    HGB 15.0 12/14/2016     08/30/2017       Imaging:         Medical Tests:         Summary of old records / correspondence / consultant report:         Request outside records:         ALLERGIES  No Known Allergies     MEDICATIONS  Current Outpatient Prescriptions on File Prior to Visit   Medication Sig Dispense Refill   • amLODIPine (NORVASC) 10 MG tablet Take 1 tablet by mouth Daily. 90 tablet 0     No current facility-administered medications on file prior to visit.        PFSH:     The following portions of the patient's history were reviewed and updated as appropriate: Allergies / Current Medications / Past Medical History / Surgical History / Social History / Family History    PROBLEM LIST   Patient Active Problem List   Diagnosis   • Impaired fasting glucose   • Hypertension   • Hyperlipidemia   • Impotence of organic origin   • Osteoarthritis of knee   • Tear of medial meniscus of knee   • Obesity (BMI 30-39.9)   • Routine health maintenance   • Nocturia   • Abnormal laboratory test       PAST MEDICAL HISTORY  Past Medical History:   Diagnosis Date   • ED (erectile dysfunction)    • Hyperlipidemia    • Hypertension     • Skin cancer 05/2017    Basal cell carcinoma       SURGICAL HISTORY  Past Surgical History:   Procedure Laterality Date   • NO PAST SURGERIES     • SKIN CANCER EXCISION      basal cell       SOCIAL HISTORY  Social History     Social History   • Marital status: Single     Spouse name: N/A   • Number of children: 3   • Years of education: N/A     Occupational History   • 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      Social History Main Topics   • Smoking status: Former Smoker     Packs/day: 0.50     Years: 23.00     Types: Cigars     Start date: 1968     Quit date: 1991   • Smokeless tobacco: Never Used   • Alcohol use No   • Drug use: None   • Sexual activity: No     Other Topics Concern   • None     Social History Narrative    Single       FAMILY HISTORY  Family History   Problem Relation Age of Onset   • Hypertension Father    • Hypertension Brother          **Moose Disclaimer:   Much of this encounter note is an electronic transcription/translation of spoken language to printed text. The electronic translation of spoken language may permit erroneous, or at times, nonsensical words or phrases to be inadvertently transcribed. Although I have reviewed the note for such errors, some may still exist.

## 2017-11-28 LAB
APPEARANCE UR: CLEAR
BILIRUB UR QL STRIP: NEGATIVE
CHOLEST SERPL-MCNC: 191 MG/DL (ref 0–200)
COLOR UR: YELLOW
GLUCOSE UR QL: NEGATIVE
HDLC SERPL-MCNC: 47 MG/DL (ref 40–60)
HGB UR QL STRIP: NEGATIVE
KETONES UR QL STRIP: NEGATIVE
LDLC SERPL CALC-MCNC: 123 MG/DL (ref 0–100)
LEUKOCYTE ESTERASE UR QL STRIP: NEGATIVE
NITRITE UR QL STRIP: NEGATIVE
PH UR STRIP: 7 [PH] (ref 5–8)
PROT UR QL STRIP: NEGATIVE
PSA SERPL-MCNC: 0.98 NG/ML (ref 0–4)
SP GR UR: 1.02 (ref 1–1.03)
TRIGL SERPL-MCNC: 105 MG/DL (ref 0–150)
UROBILINOGEN UR STRIP-MCNC: NORMAL MG/DL
VLDLC SERPL CALC-MCNC: 21 MG/DL (ref 5–40)

## 2017-12-19 ENCOUNTER — APPOINTMENT (OUTPATIENT)
Dept: SLEEP MEDICINE | Facility: HOSPITAL | Age: 62
End: 2017-12-19
Attending: PSYCHIATRY & NEUROLOGY

## 2018-01-29 DIAGNOSIS — I10 ESSENTIAL HYPERTENSION: ICD-10-CM

## 2018-01-29 RX ORDER — AMLODIPINE BESYLATE 10 MG/1
TABLET ORAL
Qty: 90 TABLET | Refills: 0 | Status: SHIPPED | OUTPATIENT
Start: 2018-01-29 | End: 2018-05-03 | Stop reason: SDUPTHER

## 2018-04-23 ENCOUNTER — OFFICE VISIT (OUTPATIENT)
Dept: INTERNAL MEDICINE | Age: 63
End: 2018-04-23

## 2018-04-23 VITALS
OXYGEN SATURATION: 96 % | TEMPERATURE: 97.7 F | HEART RATE: 95 BPM | DIASTOLIC BLOOD PRESSURE: 90 MMHG | BODY MASS INDEX: 30.36 KG/M2 | WEIGHT: 236.6 LBS | HEIGHT: 74 IN | SYSTOLIC BLOOD PRESSURE: 148 MMHG

## 2018-04-23 DIAGNOSIS — J20.9 ACUTE BRONCHITIS, UNSPECIFIED ORGANISM: Primary | ICD-10-CM

## 2018-04-23 PROCEDURE — 99213 OFFICE O/P EST LOW 20 MIN: CPT | Performed by: NURSE PRACTITIONER

## 2018-04-23 RX ORDER — AZITHROMYCIN 250 MG/1
TABLET, FILM COATED ORAL
Qty: 6 TABLET | Refills: 0 | Status: SHIPPED | OUTPATIENT
Start: 2018-04-23 | End: 2018-05-29

## 2018-04-23 RX ORDER — METHYLPREDNISOLONE 4 MG/1
TABLET ORAL
Qty: 1 EACH | Refills: 0 | Status: SHIPPED | OUTPATIENT
Start: 2018-04-23 | End: 2018-05-29

## 2018-04-23 NOTE — PROGRESS NOTES
Subjective   Miles Pierre is a 62 y.o. male.     Cough   This is a new problem. Episode onset: 3 weeks ago. The problem has been gradually worsening. The cough is productive of sputum. Associated symptoms include wheezing. Pertinent negatives include no chest pain, chills, ear congestion, ear pain, fever, hemoptysis, nasal congestion, postnasal drip, rhinorrhea, sore throat or shortness of breath. Nothing aggravates the symptoms. Treatments tried: 1 dose of Mucinex. There is no history of asthma, bronchitis, COPD, environmental allergies or pneumonia.        The following portions of the patient's history were reviewed and updated as appropriate: allergies, current medications, past family history, past medical history, past social history, past surgical history and problem list.    Review of Systems   Constitutional: Negative for chills and fever.   HENT: Negative for ear pain, postnasal drip, rhinorrhea and sore throat.    Respiratory: Positive for cough and wheezing. Negative for hemoptysis and shortness of breath.    Cardiovascular: Negative for chest pain.   Allergic/Immunologic: Negative for environmental allergies.       Objective   Physical Exam   Constitutional: He appears well-developed and well-nourished. He is active. He does not appear ill. No distress.   Cardiovascular: Normal rate, regular rhythm and normal heart sounds.    Pulmonary/Chest: Effort normal. He has wheezes (scattered expiratory wheezes throughout).   Neurological: He is alert.   Psychiatric: He has a normal mood and affect. His speech is normal and behavior is normal. Thought content normal.   Nursing note and vitals reviewed.        Assessment/Plan   Problems Addressed this Visit     None      Visit Diagnoses     Acute bronchitis, unspecified organism    -  Primary    Relevant Medications    azithromycin (ZITHROMAX Z-ANA) 250 MG tablet    MethylPREDNISolone (MEDROL, ANA,) 4 MG tablet        1. Acute bronchitis, unspecified  organism    - azithromycin (ZITHROMAX Z-ANA) 250 MG tablet; Take 2 tablets the first day, then 1 tablet daily for 4 days.  Dispense: 6 tablet; Refill: 0  - MethylPREDNISolone (MEDROL, ANA,) 4 MG tablet; Take as directed on package instructions.  Dispense: 1 each; Refill: 0

## 2018-05-03 DIAGNOSIS — I10 ESSENTIAL HYPERTENSION: ICD-10-CM

## 2018-05-03 RX ORDER — AMLODIPINE BESYLATE 10 MG/1
TABLET ORAL
Qty: 90 TABLET | Refills: 0 | Status: SHIPPED | OUTPATIENT
Start: 2018-05-03 | End: 2018-08-07 | Stop reason: SDUPTHER

## 2018-05-29 ENCOUNTER — OFFICE VISIT (OUTPATIENT)
Dept: INTERNAL MEDICINE | Age: 63
End: 2018-05-29

## 2018-05-29 VITALS
HEART RATE: 80 BPM | HEIGHT: 74 IN | BODY MASS INDEX: 30.52 KG/M2 | DIASTOLIC BLOOD PRESSURE: 84 MMHG | TEMPERATURE: 97.6 F | WEIGHT: 237.8 LBS | SYSTOLIC BLOOD PRESSURE: 120 MMHG | OXYGEN SATURATION: 97 %

## 2018-05-29 DIAGNOSIS — R73.09 ELEVATED GLUCOSE: ICD-10-CM

## 2018-05-29 DIAGNOSIS — Z23 ENCOUNTER FOR IMMUNIZATION: ICD-10-CM

## 2018-05-29 DIAGNOSIS — E66.9 OBESITY (BMI 30-39.9): ICD-10-CM

## 2018-05-29 DIAGNOSIS — I10 ESSENTIAL HYPERTENSION: Primary | ICD-10-CM

## 2018-05-29 PROCEDURE — 99214 OFFICE O/P EST MOD 30 MIN: CPT | Performed by: INTERNAL MEDICINE

## 2018-05-29 NOTE — PROGRESS NOTES
"[facility] [place] [facility] MED[facility]INE        Miles Pierre / 62 y.o. / male  05/29/2018      ASSESSMENT & PLAN:    Problem List Items Addressed This Visit        Unprioritized    Hypertension - Primary    Relevant Medications    amLODIPine (NORVASC) 10 MG tablet    Obesity (BMI 30-39.9)      Other Visit Diagnoses     Elevated glucose        Relevant Orders    Glucose, Random    Encounter for immunization            Orders Placed This Encounter   Procedures   • Glucose, Random       Summary/Discussion:    Number-one hypertension stable on current medication.  Plan: Same meds, blood pressure check 4 months.    #2 weight control, encouraged patient continue with his walking in effort to speed his weight loss.    #3 history of elevated glucose from last summer, we'll schedule fasting glucose at his convenience.    #4 did recommend shingles vaccine for him as well.      Return in about 4 months (around 9/29/2018) for Blood pressure check.    ____________________________________________________________________    VITALS    Visit Vitals  /84   Pulse 80   Temp 97.6 °F (36.4 °C)   Ht 188 cm (74.02\")   Wt 108 kg (237 lb 12.8 oz)   SpO2 97%   BMI 30.52 kg/m²       BP Readings from Last 3 Encounters:   05/29/18 120/84   04/23/18 148/90   11/27/17 140/75     Wt Readings from Last 3 Encounters:   05/29/18 108 kg (237 lb 12.8 oz)   04/23/18 107 kg (236 lb 9.6 oz)   11/27/17 106 kg (234 lb)      Body mass index is 30.52 kg/m².    CC:  Main reason(s) for today's visit: Hypertension      HPI:     She presents this morning for follow-up of hypertension.  He is compliant with medication, and dietary salt restriction.  He does monitor blood pressure at home and typically runs in the 140s over 70s to 80s.  There are no medication side effects noted.  He is walking for exercise 3.5 miles per day 5 days per week.  He is up 1 pound since November 2017.    Laboratory review August 2017, CMP elevated glucose 154 otherwise normal, thyroid normal, " November 2017 lipids normal, CBC August 2017 normal.    Family history update: Patient has had 2 incidents of malignancy in his family one brother with liver cancer, one with bladder cancer.  In light of this he has told me this morning that he will give up his cigar smoking when he returns from vacation in one week.    Patient Care Team:  Cliff Parekh MD as PCP - General (Internal Medicine)  ____________________________________________________________________    REVIEW OF SYSTEMS    Review of Systems   Respiratory: Negative for chest tightness and shortness of breath.    Cardiovascular: Negative for chest pain and palpitations.   Genitourinary: Positive for frequency.        Patient has several episodes of nocturia per evening, however he is not interested in medication at this time.  He is aware that when these episodes become troublesome, he can contact me for medication to alleviate this problem.   Neurological: Negative for dizziness, syncope, speech difficulty, weakness, light-headedness and headaches.           PHYSICAL EXAMINATION    Physical Exam   Constitutional: He is oriented to person, place, and time. He appears well-developed and well-nourished. No distress.   Cardiovascular: Normal rate, regular rhythm, normal heart sounds and intact distal pulses.  Exam reveals no gallop and no friction rub.    No murmur heard.  Pulmonary/Chest: Effort normal and breath sounds normal. He has no wheezes. He has no rales.   Musculoskeletal: He exhibits no edema.   Neurological: He is alert and oriented to person, place, and time.   Skin: Skin is warm and dry. No rash noted.   Psychiatric: He has a normal mood and affect. His behavior is normal. Judgment and thought content normal.   Nursing note and vitals reviewed.      REVIEWED DATA:    Labs:     Lab Results   Component Value Date     08/30/2017    K 5.1 08/30/2017    AST 49 (H) 08/30/2017    ALT 45 (H) 08/30/2017    BUN 16 08/30/2017    CREATININE 0.83  08/30/2017    CREATININE 0.80 12/14/2016    CREATININE 0.66 (L) 12/01/2016    EGFRIFNONA 94 08/30/2017    EGFRIFAFRI 113 08/30/2017       Lab Results   Component Value Date    HGBA1C 6.00 (H) 07/24/2017    HGBA1C 5.5 09/04/2015       Lab Results   Component Value Date     (H) 11/28/2017     (H) 12/14/2016    HDL 47 11/28/2017    TRIG 105 11/28/2017       Lab Results   Component Value Date    TSH 1.240 08/30/2017    FREET4 1.09 08/30/2017       Lab Results   Component Value Date    WBC 5.04 08/30/2017    HGB 14.0 08/30/2017    HGB 15.0 12/14/2016     08/30/2017       Imaging:         Medical Tests:         Summary of old records / correspondence / consultant report:         Request outside records:         ALLERGIES  No Known Allergies     MEDICATIONS  Current Outpatient Prescriptions   Medication Sig Dispense Refill   • amLODIPine (NORVASC) 10 MG tablet TAKE ONE TABLET BY MOUTH ONCE DAILY 90 tablet 0     No current facility-administered medications for this visit.        PFSH:     The following portions of the patient's history were reviewed and updated as appropriate: Allergies / Current Medications / Past Medical History / Surgical History / Social History / Family History    PROBLEM LIST   Patient Active Problem List   Diagnosis   • Impaired fasting glucose   • Hypertension   • Hyperlipidemia   • Impotence of organic origin   • Osteoarthritis of knee   • Tear of medial meniscus of knee   • Obesity (BMI 30-39.9)   • Routine health maintenance   • Nocturia   • Abnormal laboratory test   • Other sleep apnea       PAST MEDICAL HISTORY  Past Medical History:   Diagnosis Date   • ED (erectile dysfunction)    • Hyperlipidemia    • Hypertension    • Skin cancer 05/2017    Basal cell carcinoma       SURGICAL HISTORY  Past Surgical History:   Procedure Laterality Date   • NO PAST SURGERIES     • SKIN CANCER EXCISION      basal cell       SOCIAL HISTORY  Social History     Social History   • Marital  status: Single   • Number of children: 3     Occupational History   • 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      Social History Main Topics   • Smoking status: Former Smoker     Packs/day: 0.50     Years: 23.00     Types: Cigars     Start date: 1968     Quit date: 1991   • Smokeless tobacco: Never Used   • Alcohol use No   • Drug use: Unknown   • Sexual activity: No     Other Topics Concern   • Not on file     Social History Narrative    Single       FAMILY HISTORY  Family History   Problem Relation Age of Onset   • Hypertension Father    • Hypertension Brother    • Cancer Brother         Liver   • Cancer Brother         Bladder         **Moose Disclaimer:   Much of this encounter note is an electronic transcription/translation of spoken language to printed text. The electronic translation of spoken language may permit erroneous, or at times, nonsensical words or phrases to be inadvertently transcribed. Although I have reviewed the note for such errors, some may still exist.

## 2018-06-22 LAB — GLUCOSE SERPL-MCNC: 124 MG/DL (ref 65–99)

## 2018-06-24 DIAGNOSIS — R73.01 IMPAIRED FASTING GLUCOSE: Primary | ICD-10-CM

## 2018-06-27 ENCOUNTER — TELEPHONE (OUTPATIENT)
Dept: INTERNAL MEDICINE | Age: 63
End: 2018-06-27

## 2018-06-27 NOTE — TELEPHONE ENCOUNTER
JUSTINE Parekh-    Patient declined the referral for Diabetic Education. He said that he is going to work on lowering his blood sugars and doesn't need education at this time. The referral has been closed.   I concur with the Admission Order and I certify that services are provided in accordance with Section 42 CFR § 412.3

## 2018-07-20 ENCOUNTER — TELEPHONE (OUTPATIENT)
Dept: INTERNAL MEDICINE | Age: 63
End: 2018-07-20

## 2018-07-20 NOTE — TELEPHONE ENCOUNTER
Patient has been seeing blood in his stool for several weeks.no pain. It has happened before but usually goes away. I offered an appt today but he is out of town. He said he could come in Monday 7/23 but you don't have any opening. Please call patient and advise at  [phone]

## 2018-07-25 ENCOUNTER — OFFICE VISIT (OUTPATIENT)
Dept: INTERNAL MEDICINE | Age: 63
End: 2018-07-25

## 2018-07-25 VITALS
OXYGEN SATURATION: 96 % | BODY MASS INDEX: 30.39 KG/M2 | SYSTOLIC BLOOD PRESSURE: 126 MMHG | DIASTOLIC BLOOD PRESSURE: 74 MMHG | WEIGHT: 236.8 LBS | TEMPERATURE: 97.6 F | HEART RATE: 66 BPM | HEIGHT: 74 IN

## 2018-07-25 DIAGNOSIS — K92.1 HEMATOCHEZIA: Primary | ICD-10-CM

## 2018-07-25 LAB
BASOPHILS # BLD AUTO: 0.01 10*3/MM3 (ref 0–0.2)
BASOPHILS NFR BLD AUTO: 0.2 % (ref 0–1.5)
EOSINOPHIL # BLD AUTO: 0.18 10*3/MM3 (ref 0–0.7)
EOSINOPHIL NFR BLD AUTO: 3.5 % (ref 0.3–6.2)
ERYTHROCYTE [DISTWIDTH] IN BLOOD BY AUTOMATED COUNT: 13.3 % (ref 11.5–14.5)
HCT VFR BLD AUTO: 46 % (ref 40.4–52.2)
HGB BLD-MCNC: 14.6 G/DL (ref 13.7–17.6)
IMM GRANULOCYTES # BLD: 0 10*3/MM3 (ref 0–0.03)
IMM GRANULOCYTES NFR BLD: 0 % (ref 0–0.5)
LYMPHOCYTES # BLD AUTO: 1.77 10*3/MM3 (ref 0.9–4.8)
LYMPHOCYTES NFR BLD AUTO: 34.3 % (ref 19.6–45.3)
MCH RBC QN AUTO: 29.9 PG (ref 27–32.7)
MCHC RBC AUTO-ENTMCNC: 31.7 G/DL (ref 32.6–36.4)
MCV RBC AUTO: 94.1 FL (ref 79.8–96.2)
MONOCYTES # BLD AUTO: 0.4 10*3/MM3 (ref 0.2–1.2)
MONOCYTES NFR BLD AUTO: 7.8 % (ref 5–12)
NEUTROPHILS # BLD AUTO: 2.8 10*3/MM3 (ref 1.9–8.1)
NEUTROPHILS NFR BLD AUTO: 54.2 % (ref 42.7–76)
PLATELET # BLD AUTO: 233 10*3/MM3 (ref 140–500)
RBC # BLD AUTO: 4.89 10*6/MM3 (ref 4.6–6)
WBC # BLD AUTO: 5.16 10*3/MM3 (ref 4.5–10.7)

## 2018-07-25 PROCEDURE — 99213 OFFICE O/P EST LOW 20 MIN: CPT | Performed by: INTERNAL MEDICINE

## 2018-07-25 NOTE — PROGRESS NOTES
"[facility] [place] [facility] INTERNAL MEDICINE  LALI CENTENO MD      Miles Pierre / 62 y.o. / male  07/25/2018      ASSESSMENT & PLAN:    Problem List Items Addressed This Visit     None        No orders of the defined types were placed in this encounter.      Summary/Discussion:    Number-one hematochezia recent onset and persistence.  Differential diagnosis includes internal hemorrhoids or diverticula.  Recommend CBC today, consult for colonoscopy.  Patient was asked to contact me if he has not heard within next 48 hours regarding his appointment time.      No Follow-up on file.    ____________________________________________________________________    VITALS    Visit Vitals  /74   Pulse 66   Temp 97.6 °F (36.4 °C)   Ht 188 cm (74.02\")   Wt 107 kg (236 lb 12.8 oz)   SpO2 96%   BMI 30.39 kg/m²       BP Readings from Last 3 Encounters:   07/25/18 126/74   05/29/18 120/84   04/23/18 148/90     Wt Readings from Last 3 Encounters:   07/25/18 107 kg (236 lb 12.8 oz)   05/29/18 108 kg (237 lb 12.8 oz)   04/23/18 107 kg (236 lb 9.6 oz)      Body mass index is 30.39 kg/m².    CC:  Main reason(s) for today's visit: Rectal Bleeding      HPI:     Patient presents this morning with 2-3 week history of rectal bleeding.  Patient noted bright red blood on the toilet tissue after wiping following a bowel movement.  He did note some discomfort while passing stool.  There is no evidence of hemorrhoid from his history.  Patient also had blood this morning.  His last colonoscopy was approximate 6 years ago and was normal.Patient is concerned since he has 2 brothers dealing with various malignancies at this time.  There is no family history of colorectal carcinoma.    Patient Care Team:  Lali Centeno MD as PCP - General (Internal Medicine)  ____________________________________________________________________    REVIEW OF SYSTEMS    Review of Systems   Constitutional: Negative for appetite change, chills, diaphoresis, fever and unexpected weight " change.   Respiratory: Negative for chest tightness and shortness of breath.    Cardiovascular: Negative for chest pain and palpitations.   Gastrointestinal: Negative for abdominal pain.   Neurological: Negative for dizziness, syncope, speech difficulty, weakness, light-headedness and headaches.         PHYSICAL EXAMINATION    Physical Exam   Constitutional: He is oriented to person, place, and time. He appears well-developed and well-nourished. No distress.   Cardiovascular: Normal rate, regular rhythm, normal heart sounds and intact distal pulses.  Exam reveals no gallop and no friction rub.    No murmur heard.  Pulmonary/Chest: Effort normal and breath sounds normal. He has no wheezes. He has no rales.   Abdominal: Soft. Bowel sounds are normal. He exhibits no distension. There is no hepatosplenomegaly. There is no tenderness. There is no guarding.   Genitourinary: Rectum normal and prostate normal.   Genitourinary Comments: No fissure demonstrable nor external hemorrhoids noted.   Musculoskeletal: He exhibits no edema.   Neurological: He is alert and oriented to person, place, and time.   Skin: Skin is warm and dry. No rash noted.   Psychiatric: He has a normal mood and affect. His behavior is normal. Judgment and thought content normal.   Nursing note and vitals reviewed.        REVIEWED DATA:    Labs:     Lab Results   Component Value Date     08/30/2017    K 5.1 08/30/2017    AST 49 (H) 08/30/2017    ALT 45 (H) 08/30/2017    BUN 16 08/30/2017    CREATININE 0.83 08/30/2017    CREATININE 0.80 12/14/2016    CREATININE 0.66 (L) 12/01/2016    EGFRIFNONA 94 08/30/2017    EGFRIFAFRI 113 08/30/2017       Lab Results   Component Value Date    HGBA1C 6.00 (H) 07/24/2017    HGBA1C 5.5 09/04/2015       Lab Results   Component Value Date     (H) 11/28/2017     (H) 12/14/2016    HDL 47 11/28/2017    TRIG 105 11/28/2017       Lab Results   Component Value Date    TSH 1.240 08/30/2017    FREET4 1.09  08/30/2017       Lab Results   Component Value Date    WBC 5.04 08/30/2017    HGB 14.0 08/30/2017    HGB 15.0 12/14/2016     08/30/2017       Lab Results   Component Value Date    PSA 0.984 11/28/2017         Imaging:         Medical Tests:         Summary of old records / correspondence / consultant report:         Request outside records:         ALLERGIES  No Known Allergies     MEDICATIONS  Current Outpatient Prescriptions   Medication Sig Dispense Refill   • amLODIPine (NORVASC) 10 MG tablet TAKE ONE TABLET BY MOUTH ONCE DAILY 90 tablet 0     No current facility-administered medications for this visit.        PFSH:     The following portions of the patient's history were reviewed and updated as appropriate: Allergies / Current Medications / Past Medical History / Surgical History / Social History / Family History    PROBLEM LIST   Patient Active Problem List   Diagnosis   • Impaired fasting glucose   • Hypertension   • Hyperlipidemia   • Impotence of organic origin   • Osteoarthritis of knee   • Tear of medial meniscus of knee   • Obesity (BMI 30-39.9)   • Routine health maintenance   • Nocturia   • Abnormal laboratory test   • Other sleep apnea       PAST MEDICAL HISTORY  Past Medical History:   Diagnosis Date   • ED (erectile dysfunction)    • Hyperlipidemia    • Hypertension    • Skin cancer 05/2017    Basal cell carcinoma       SURGICAL HISTORY  Past Surgical History:   Procedure Laterality Date   • NO PAST SURGERIES     • SKIN CANCER EXCISION      basal cell       SOCIAL HISTORY  Social History     Social History   • Marital status: Single   • Number of children: 3     Occupational History   • 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      Social History Main Topics   • Smoking status: Former Smoker     Packs/day: 0.50     Years: 23.00     Types: Cigars     Start date: 1968     Quit date: 1991   • Smokeless tobacco: Never Used   • Alcohol use No   • Drug use: Unknown   • Sexual activity: No     Other  Topics Concern   • Not on file     Social History Narrative    Single       FAMILY HISTORY  Family History   Problem Relation Age of Onset   • Hypertension Father    • Hypertension Brother    • Cancer Brother         Liver   • Cancer Brother         Bladder       Health Maintenance Due   Topic   • ZOSTER VACCINE (1 of 2)   • ANNUAL PHYSICAL        Overdue health maintenance interventions  reviewed with patient.    Dictated utilizing Dragon voice recognition software

## 2018-08-07 DIAGNOSIS — I10 ESSENTIAL HYPERTENSION: ICD-10-CM

## 2018-08-07 RX ORDER — AMLODIPINE BESYLATE 10 MG/1
TABLET ORAL
Qty: 90 TABLET | Refills: 0 | Status: SHIPPED | OUTPATIENT
Start: 2018-08-07 | End: 2018-11-12 | Stop reason: SDUPTHER

## 2018-08-14 ENCOUNTER — OFFICE VISIT (OUTPATIENT)
Dept: GASTROENTEROLOGY | Facility: CLINIC | Age: 63
End: 2018-08-14

## 2018-08-14 VITALS
SYSTOLIC BLOOD PRESSURE: 147 MMHG | HEART RATE: 73 BPM | HEIGHT: 75 IN | WEIGHT: 239 LBS | BODY MASS INDEX: 29.72 KG/M2 | DIASTOLIC BLOOD PRESSURE: 81 MMHG

## 2018-08-14 DIAGNOSIS — K92.2 LOWER GI BLEED: Primary | ICD-10-CM

## 2018-08-14 PROCEDURE — 99204 OFFICE O/P NEW MOD 45 MIN: CPT | Performed by: INTERNAL MEDICINE

## 2018-08-14 RX ORDER — SODIUM CHLORIDE, SODIUM LACTATE, POTASSIUM CHLORIDE, CALCIUM CHLORIDE 600; 310; 30; 20 MG/100ML; MG/100ML; MG/100ML; MG/100ML
30 INJECTION, SOLUTION INTRAVENOUS CONTINUOUS
Status: CANCELLED | OUTPATIENT
Start: 2018-09-07

## 2018-08-14 NOTE — PROGRESS NOTES
Subjective   Miles Pierre is a 62 y.o.. male is being seen for consultation today at the request of No ref. provider found    Chief Complaint   Patient presents with   • Rectal Bleeding       History of Present Illness  The patient has had several episodes of bright red blood per rectum.  Recent blood work is demonstrated normal blood counts, no anemia.  This is not associated with abdominal pain and is not provoked by anything the patient's aware of.  He describes the symptoms as mild.  He is had prior colonoscopy but does not recall exactly how long ago, probably about 5 years ago.    The following portions of the patient's history were reviewed and updated as appropriate: allergies, current medications, past family history, past medical history, past social history, past surgical history and problem list.      Current Outpatient Prescriptions:   •  amLODIPine (NORVASC) 10 MG tablet, TAKE 1 TABLET BY MOUTH ONCE DAILY, Disp: 90 tablet, Rfl: 0    Family History   Problem Relation Age of Onset   • Hypertension Father    • Hypertension Brother    • Cancer Brother         Liver   • Cancer Brother         Bladder       Review of Systems   Constitutional: Negative for appetite change, diaphoresis, fatigue, fever and unexpected weight change.   HENT: Negative for hearing loss, mouth sores, sore throat and trouble swallowing.    Eyes: Negative for pain and redness.   Respiratory: Negative for choking and shortness of breath.    Cardiovascular: Negative for chest pain and leg swelling.   Gastrointestinal: Positive for anal bleeding. Negative for abdominal distention, abdominal pain, blood in stool, constipation, diarrhea, nausea, rectal pain and vomiting.   Genitourinary: Negative for flank pain and hematuria.   Musculoskeletal: Negative for arthralgias and joint swelling.   Skin: Negative for color change and rash.   Allergic/Immunologic: Negative for food allergies and immunocompromised state.   Neurological:  Negative for dizziness, seizures and headaches.   Hematological: Does not bruise/bleed easily.   Psychiatric/Behavioral: Negative for confusion, sleep disturbance and suicidal ideas. The patient is not nervous/anxious.        Objective   Physical Exam   Constitutional: He is oriented to person, place, and time. He appears well-developed and well-nourished.   HENT:   Head: Normocephalic and atraumatic.   Nose: Nose normal.   Eyes: Pupils are equal, round, and reactive to light. Conjunctivae are normal.   Neck: Normal range of motion. Neck supple. No thyromegaly present.   Cardiovascular: Normal heart sounds.  Exam reveals no gallop and no friction rub.    No murmur heard.  Pulmonary/Chest: Effort normal and breath sounds normal.   Abdominal: Soft. Bowel sounds are normal. He exhibits no distension and no mass. There is no tenderness.   Musculoskeletal: He exhibits no edema.   Lymphadenopathy:     He has no cervical adenopathy.   Neurological: He is alert and oriented to person, place, and time.   Skin: No rash noted. No erythema.   Psychiatric: He has a normal mood and affect. His behavior is normal.   Nursing note and vitals reviewed.      Pertinent laboratory results were reviewed.  and Pertinent old records were reviewed.     Assessment/Plan   Problems Addressed this Visit        Digestive    Lower GI bleed - Primary    Relevant Orders    Case Request (Completed)        We will proceed with colonoscopy of the patient's earliest convenience.

## 2018-08-31 ENCOUNTER — TELEPHONE (OUTPATIENT)
Dept: GASTROENTEROLOGY | Facility: CLINIC | Age: 63
End: 2018-08-31

## 2018-09-04 ENCOUNTER — TELEPHONE (OUTPATIENT)
Dept: GASTROENTEROLOGY | Facility: CLINIC | Age: 63
End: 2018-09-04

## 2018-09-04 NOTE — TELEPHONE ENCOUNTER
Patient called canceled procedure for 9-7-18. He hurt his back.  He will call when ready to reschedule. Put in [place] with Niurka at scheduling [phone].

## 2018-10-08 ENCOUNTER — OFFICE VISIT (OUTPATIENT)
Dept: INTERNAL MEDICINE | Age: 63
End: 2018-10-08

## 2018-10-08 VITALS
OXYGEN SATURATION: 95 % | HEIGHT: 75 IN | SYSTOLIC BLOOD PRESSURE: 138 MMHG | WEIGHT: 237.4 LBS | TEMPERATURE: 98.9 F | HEART RATE: 67 BPM | DIASTOLIC BLOOD PRESSURE: 85 MMHG | BODY MASS INDEX: 29.52 KG/M2

## 2018-10-08 DIAGNOSIS — R74.8 ABNORMAL TRANSAMINASES: Primary | ICD-10-CM

## 2018-10-08 DIAGNOSIS — Z23 ENCOUNTER FOR IMMUNIZATION: ICD-10-CM

## 2018-10-08 DIAGNOSIS — E78.5 HYPERLIPIDEMIA, UNSPECIFIED HYPERLIPIDEMIA TYPE: ICD-10-CM

## 2018-10-08 DIAGNOSIS — I10 ESSENTIAL HYPERTENSION: Primary | ICD-10-CM

## 2018-10-08 DIAGNOSIS — E66.9 OBESITY (BMI 30-39.9): ICD-10-CM

## 2018-10-08 DIAGNOSIS — R73.01 IMPAIRED FASTING GLUCOSE: ICD-10-CM

## 2018-10-08 DIAGNOSIS — G47.39 OTHER SLEEP APNEA: ICD-10-CM

## 2018-10-08 PROBLEM — C44.90 SKIN CANCER: Status: ACTIVE | Noted: 2017-05-01

## 2018-10-08 LAB
ALBUMIN SERPL-MCNC: 4.8 G/DL (ref 3.5–5.2)
ALBUMIN/GLOB SERPL: 1.7 G/DL
ALP SERPL-CCNC: 106 U/L (ref 39–117)
ALT SERPL-CCNC: 49 U/L (ref 1–41)
AST SERPL-CCNC: 63 U/L (ref 1–40)
BILIRUB SERPL-MCNC: 1.1 MG/DL (ref 0.1–1.2)
BUN SERPL-MCNC: 16 MG/DL (ref 8–23)
BUN/CREAT SERPL: 19.5 (ref 7–25)
CALCIUM SERPL-MCNC: 9 MG/DL (ref 8.6–10.5)
CHLORIDE SERPL-SCNC: 102 MMOL/L (ref 98–107)
CO2 SERPL-SCNC: 27.8 MMOL/L (ref 22–29)
CREAT SERPL-MCNC: 0.82 MG/DL (ref 0.76–1.27)
GLOBULIN SER CALC-MCNC: 2.8 GM/DL
GLUCOSE SERPL-MCNC: 123 MG/DL (ref 65–99)
POTASSIUM SERPL-SCNC: 4.5 MMOL/L (ref 3.5–5.2)
PROT SERPL-MCNC: 7.6 G/DL (ref 6–8.5)
SODIUM SERPL-SCNC: 143 MMOL/L (ref 136–145)

## 2018-10-08 PROCEDURE — 99214 OFFICE O/P EST MOD 30 MIN: CPT | Performed by: INTERNAL MEDICINE

## 2018-10-08 PROCEDURE — 90674 CCIIV4 VAC NO PRSV 0.5 ML IM: CPT | Performed by: INTERNAL MEDICINE

## 2018-10-08 PROCEDURE — 90471 IMMUNIZATION ADMIN: CPT | Performed by: INTERNAL MEDICINE

## 2018-10-08 NOTE — PROGRESS NOTES
"[facility] [place] INTERNAL MEDICINE  LALI CENTENO MD      Mlies Pierre / 62 y.o. / male  10/08/2018      ASSESSMENT & PLAN:    Problem List Items Addressed This Visit        Unprioritized    Hypertension - Primary    Relevant Medications    amLODIPine (NORVASC) 10 MG tablet    Other Relevant Orders    Comprehensive Metabolic Panel    Hyperlipidemia    Obesity (BMI 30-39.9)    Other sleep apnea    Overview     November 2017, patient is not currently using his CPAP.  October 2018.           Other Visit Diagnoses     Encounter for immunization        Relevant Orders    Flucelvax Quad=>4Years (PFS)        Orders Placed This Encounter   Procedures   • Flucelvax Quad=>4Years (PFS)   • Comprehensive Metabolic Panel       Summary/Discussion:    Number-one hypertension stable on current meds, continue same, blood pressure check 4 months, check CMP today since patient is fasting, follow-up results by mail.    #2 hyperlipidemia on no therapy other than diet, continue same, would recheck lipids at next visit.    #3 sleep apnea, patient will contact supplier for CPAP therapy.  If he needs anything he will let me know.  I did suggest that he restart this form of treatment to avoid risk for sudden death.    #4  weight control, see comments above under history of present illness.  Encouraged exercise and weight loss.    #5 routine health maintenance, patient is going to follow-up with Dr. Meek since I will be moving from the area.    #6 immunization update, flu shot today.      Return in about 4 months (around 2/8/2019) for Blood pressure check.    ____________________________________________________________________    VITALS    Visit Vitals  /85   Pulse 67   Temp 98.9 °F (37.2 °C)   Ht 190.5 cm (75\")   Wt 108 kg (237 lb 6.4 oz)   SpO2 95%   BMI 29.67 kg/m²       BP Readings from Last 3 Encounters:   10/08/18 138/85   08/14/18 147/81   07/25/18 126/74     Wt Readings from Last 3 Encounters:   10/08/18 108 kg (237 lb 6.4 oz) "   08/14/18 108 kg (239 lb)   07/25/18 107 kg (236 lb 12.8 oz)      Body mass index is 29.67 kg/m².    CC:  Main reason(s) for today's visit: Hypertension      HPI:     Patient presents today for follow-up of hypertension.  When last seen by me, blood pressure was well-controlled.  He is compliant with medication, dietary salt restriction, and walking 4-5 times a week for exercise.  He has lost 2 pounds since he was last seen here.    History of hyperlipidemia: Last lipids done November 2017 total cholesterol 191, HDL 47, , triglycerides 105.    Sleep apnea: Patient is not currently using CPAP but he is agreeable to checking with the supplier to have that started.  He is aware of the worsening effect on blood pressure with untreated sleep apnea and also the increased risk of sudden death.    Weight control, weight loss as above, encouraged patient to continue same.  Ideal weights about 200 pounds.    Patient Care Team:  Cliff Parekh MD as PCP - General (Internal Medicine)  ____________________________________________________________________    REVIEW OF SYSTEMS    Review of Systems   Respiratory: Negative for chest tightness and shortness of breath.    Cardiovascular: Negative for chest pain and palpitations.   Neurological: Negative for dizziness, syncope, speech difficulty, weakness, light-headedness and headaches.         PHYSICAL EXAMINATION    Physical Exam   Constitutional: He is oriented to person, place, and time. He appears well-developed and well-nourished. No distress.   Cardiovascular: Normal rate, regular rhythm, normal heart sounds and intact distal pulses.  Exam reveals no gallop and no friction rub.    No murmur heard.  Pulmonary/Chest: Effort normal and breath sounds normal. He has no wheezes. He has no rales.   Musculoskeletal: He exhibits no edema.   Neurological: He is alert and oriented to person, place, and time.   Skin: Skin is warm and dry. No rash noted.   Psychiatric: He has a  normal mood and affect. His behavior is normal. Judgment and thought content normal.   Nursing note and vitals reviewed.        REVIEWED DATA:    Labs:     Lab Results   Component Value Date     08/30/2017    K 5.1 08/30/2017    AST 49 (H) 08/30/2017    ALT 45 (H) 08/30/2017    BUN 16 08/30/2017    CREATININE 0.83 08/30/2017    CREATININE 0.80 12/14/2016    CREATININE 0.66 (L) 12/01/2016    EGFRIFNONA 94 08/30/2017    EGFRIFAFRI 113 08/30/2017       Lab Results   Component Value Date    HGBA1C 6.00 (H) 07/24/2017    HGBA1C 5.5 09/04/2015       Lab Results   Component Value Date     (H) 11/28/2017     (H) 12/14/2016    HDL 47 11/28/2017    TRIG 105 11/28/2017       Lab Results   Component Value Date    TSH 1.240 08/30/2017    FREET4 1.09 08/30/2017       Lab Results   Component Value Date    WBC 5.04 08/30/2017    HGB 14.6 07/25/2018    HGB 14.0 08/30/2017    HGB 15.0 12/14/2016     07/25/2018       Lab Results   Component Value Date    PSA 0.984 11/28/2017         Imaging:         Medical Tests:         Summary of old records / correspondence / consultant report:         Request outside records:         ALLERGIES  No Known Allergies     MEDICATIONS  Current Outpatient Prescriptions   Medication Sig Dispense Refill   • amLODIPine (NORVASC) 10 MG tablet TAKE 1 TABLET BY MOUTH ONCE DAILY 90 tablet 0     No current facility-administered medications for this visit.        PFSH:     The following portions of the patient's history were reviewed and updated as appropriate: Allergies / Current Medications / Past Medical History / Surgical History / Social History / Family History    PROBLEM LIST   Patient Active Problem List   Diagnosis   • Impaired fasting glucose   • Hypertension   • Hyperlipidemia   • Impotence of organic origin   • Osteoarthritis of knee   • Tear of medial meniscus of knee   • Obesity (BMI 30-39.9)   • Routine health maintenance   • Nocturia   • Abnormal laboratory test   •  Other sleep apnea   • Lower GI bleed   • Skin cancer       PAST MEDICAL HISTORY  Past Medical History:   Diagnosis Date   • ED (erectile dysfunction)    • Hyperlipidemia    • Hypertension    • Skin cancer 05/2017    Basal cell carcinoma       SURGICAL HISTORY  Past Surgical History:   Procedure Laterality Date   • COLONOSCOPY     • SKIN CANCER EXCISION      basal cell       SOCIAL HISTORY  Social History     Social History   • Marital status: Single   • Number of children: 3     Occupational History   • 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      Social History Main Topics   • Smoking status: Former Smoker     Packs/day: 0.50     Years: 23.00     Types: Cigars     Start date: 1968     Quit date: 1991   • Smokeless tobacco: Never Used   • Alcohol use No   • Drug use: No   • Sexual activity: No     Other Topics Concern   • Not on file     Social History Narrative    Single       FAMILY HISTORY  Family History   Problem Relation Age of Onset   • Hypertension Father    • Hypertension Brother    • Cancer Brother         Liver   • Cancer Brother         Bladder       Health Maintenance Due   Topic   • ZOSTER VACCINE (1 of 2)   • ANNUAL PHYSICAL        Overdue health maintenance interventions  reviewed with patient.    Dictated utilizing Dragon voice recognition software

## 2018-10-16 ENCOUNTER — TELEPHONE (OUTPATIENT)
Dept: INTERNAL MEDICINE | Age: 63
End: 2018-10-16

## 2018-10-16 NOTE — TELEPHONE ENCOUNTER
Pt called re: US liver, ordered 10/8/18, scheduled 10/22/18.    Pt's CMP 10/8/18 showed elevated liver enzymes.    Pt stated he had been taking ibuprofen x 5 days before labs were drawn. Pt would like to know if he can postpone US liver and have labs redrawn to check levels again.     US will cost pt approximately $600 and pt is hesitant to have it done if labs are now normal.    Please advise.    KD

## 2018-11-12 DIAGNOSIS — I10 ESSENTIAL HYPERTENSION: ICD-10-CM

## 2018-11-12 RX ORDER — AMLODIPINE BESYLATE 10 MG/1
10 TABLET ORAL DAILY
Qty: 90 TABLET | Refills: 2 | Status: SHIPPED | OUTPATIENT
Start: 2018-11-12 | End: 2019-02-12 | Stop reason: SDUPTHER

## 2018-11-14 LAB — AST SERPL-CCNC: 48 U/L (ref 1–40)

## 2018-12-07 ENCOUNTER — RESULTS ENCOUNTER (OUTPATIENT)
Dept: INTERNAL MEDICINE | Age: 63
End: 2018-12-07

## 2018-12-07 DIAGNOSIS — R73.01 IMPAIRED FASTING GLUCOSE: ICD-10-CM

## 2018-12-07 DIAGNOSIS — R74.8 ABNORMAL TRANSAMINASES: ICD-10-CM

## 2019-02-12 ENCOUNTER — TELEPHONE (OUTPATIENT)
Dept: INTERNAL MEDICINE | Age: 64
End: 2019-02-12

## 2019-02-12 DIAGNOSIS — I10 ESSENTIAL HYPERTENSION: ICD-10-CM

## 2019-02-12 RX ORDER — AMLODIPINE BESYLATE 10 MG/1
10 TABLET ORAL DAILY
Qty: 90 TABLET | Refills: 1 | Status: SHIPPED | OUTPATIENT
Start: 2019-02-12 | End: 2019-08-20 | Stop reason: SDUPTHER

## 2019-02-12 NOTE — TELEPHONE ENCOUNTER
Patient needs a refill of Amlodipine. New pharmacy is [facility] off [address] in [address] Patient is no longer using the [facility] in his chart.

## 2019-02-14 ENCOUNTER — OFFICE VISIT (OUTPATIENT)
Dept: INTERNAL MEDICINE | Age: 64
End: 2019-02-14

## 2019-02-14 VITALS
BODY MASS INDEX: 29.52 KG/M2 | HEART RATE: 79 BPM | SYSTOLIC BLOOD PRESSURE: 142 MMHG | DIASTOLIC BLOOD PRESSURE: 80 MMHG | TEMPERATURE: 97.3 F | OXYGEN SATURATION: 98 % | WEIGHT: 237.4 LBS | HEIGHT: 75 IN

## 2019-02-14 DIAGNOSIS — Z13.6 SCREENING FOR AAA (AORTIC ABDOMINAL ANEURYSM): ICD-10-CM

## 2019-02-14 DIAGNOSIS — R73.01 IMPAIRED FASTING GLUCOSE: ICD-10-CM

## 2019-02-14 DIAGNOSIS — G47.39 OTHER SLEEP APNEA: ICD-10-CM

## 2019-02-14 DIAGNOSIS — E78.5 HYPERLIPIDEMIA, UNSPECIFIED HYPERLIPIDEMIA TYPE: ICD-10-CM

## 2019-02-14 DIAGNOSIS — I10 ESSENTIAL HYPERTENSION: Primary | ICD-10-CM

## 2019-02-14 DIAGNOSIS — Z23 ENCOUNTER FOR IMMUNIZATION: ICD-10-CM

## 2019-02-14 PROCEDURE — 90471 IMMUNIZATION ADMIN: CPT | Performed by: NURSE PRACTITIONER

## 2019-02-14 PROCEDURE — 99214 OFFICE O/P EST MOD 30 MIN: CPT | Performed by: NURSE PRACTITIONER

## 2019-02-14 PROCEDURE — 90670 PCV13 VACCINE IM: CPT | Performed by: NURSE PRACTITIONER

## 2019-02-14 RX ORDER — LOSARTAN POTASSIUM 25 MG/1
25 TABLET ORAL DAILY
Qty: 30 TABLET | Refills: 1 | Status: SHIPPED | OUTPATIENT
Start: 2019-02-14 | End: 2019-04-29 | Stop reason: RX

## 2019-02-14 NOTE — PROGRESS NOTES
Subjective   Miles Pierre is a 63 y.o. male.     Hypertension   This is a chronic problem. The problem is controlled. Pertinent negatives include no anxiety, blurred vision, chest pain, headaches, malaise/fatigue, neck pain, orthopnea, palpitations, peripheral edema, PND, shortness of breath or sweats. Risk factors for coronary artery disease include male gender. Current antihypertension treatment includes calcium channel blockers. Identifiable causes of hypertension include sleep apnea.    He has history of sleep apnea since 2017, has not been compliant with machine or with getting supplies as previously discussed with Dr. Parekh.  He admits to poor diet, frequent fast food.  Poor exercise recently, although he does report he walks approximately 3-1/2 miles a day during warmer weather.  Weight is the same as previous visit.  Home BP are about 140/80s per patient, no lower.     History of impaired fasting glucose, last hemoglobin A1c was 6.0 in 2017.  He also has history of slightly elevated ALT and AST, had previously been ordered to have liver ultrasound done by Dr. Parekh, although this was not done by the patient.     The following portions of the patient's history were reviewed and updated as appropriate: allergies, current medications, past family history, past medical history, past social history, past surgical history and problem list.    Review of Systems   Constitutional: Negative for malaise/fatigue.   Eyes: Negative for blurred vision.   Respiratory: Negative for shortness of breath.    Cardiovascular: Negative for chest pain, palpitations, orthopnea and PND.   Musculoskeletal: Negative for neck pain.       Objective   Physical Exam   Constitutional: He appears well-developed and well-nourished. He is active. He does not appear ill. No distress.   Cardiovascular: Normal rate, regular rhythm and normal heart sounds.   Pulmonary/Chest: Effort normal and breath sounds normal.   Neurological: He is  alert.   Psychiatric: He has a normal mood and affect. His speech is normal and behavior is normal. Thought content normal.   Nursing note and vitals reviewed.        Assessment/Plan   Problems Addressed this Visit        Cardiovascular and Mediastinum    Hypertension - Primary    Relevant Medications    losartan (COZAAR) 25 MG tablet    Other Relevant Orders    Comprehensive Metabolic Panel    Hyperlipidemia       Respiratory    Other sleep apnea       Endocrine    Impaired fasting glucose    Relevant Orders    Hemoglobin A1c      Other Visit Diagnoses     Encounter for immunization        Relevant Orders    Pneumococcal Conjugate Vaccine 13-Valent All (Completed)    Screening for AAA (aortic abdominal aneurysm)        Relevant Orders    [place] aaa screen limited        1. Essential hypertension  Suboptimal control.  Discussed with patient recommend adding second agent.  He does not want to take diuretic due to increased nocturia already.  Had issues with cough with lisinopril in the past.  Discussed we will treat with low-dose losartan, discussed possible side effects from medication.  We will continue to monitor blood pressure at home.  Reiterated that he needs to work on improving his diet and increasing weight loss.  Discussed need for compliance with CPAP, he is aware of risk of untreated sleep apnea. Will follow up BP recheck in 6 weeks with lab.     - Comprehensive Metabolic Panel  - losartan (COZAAR) 25 MG tablet; Take 1 tablet by mouth Daily.  Dispense: 30 tablet; Refill: 1    2. Impaired fasting glucose    - Hemoglobin A1c    3. Hyperlipidemia, unspecified hyperlipidemia type  Check fasting lipid panel next visit.    4. Other sleep apnea      5. Encounter for immunization    - Pneumococcal Conjugate Vaccine 13-Valent All    6. Screening for AAA (aortic abdominal aneurysm)    - [place] aaa screen limited

## 2019-02-15 DIAGNOSIS — R79.89 ELEVATED LIVER FUNCTION TESTS: Primary | ICD-10-CM

## 2019-02-15 LAB
ALBUMIN SERPL-MCNC: 4.5 G/DL (ref 3.6–4.8)
ALBUMIN/GLOB SERPL: 1.9 {RATIO} (ref 1.2–2.2)
ALP SERPL-CCNC: 118 IU/L (ref 39–117)
ALT SERPL-CCNC: 55 IU/L (ref 0–44)
AST SERPL-CCNC: 57 IU/L (ref 0–40)
BILIRUB SERPL-MCNC: 1.5 MG/DL (ref 0–1.2)
BUN SERPL-MCNC: 15 MG/DL (ref 8–27)
BUN/CREAT SERPL: 19 (ref 10–24)
CALCIUM SERPL-MCNC: 9.1 MG/DL (ref 8.6–10.2)
CHLORIDE SERPL-SCNC: 104 MMOL/L (ref 96–106)
CO2 SERPL-SCNC: 23 MMOL/L (ref 20–29)
CREAT SERPL-MCNC: 0.77 MG/DL (ref 0.76–1.27)
GLOBULIN SER CALC-MCNC: 2.4 G/DL (ref 1.5–4.5)
GLUCOSE SERPL-MCNC: 140 MG/DL (ref 65–99)
HBA1C MFR BLD: 5.8 % (ref 4.8–5.6)
POTASSIUM SERPL-SCNC: 4.9 MMOL/L (ref 3.5–5.2)
PROT SERPL-MCNC: 6.9 G/DL (ref 6–8.5)
SODIUM SERPL-SCNC: 142 MMOL/L (ref 134–144)

## 2019-02-26 ENCOUNTER — TELEPHONE (OUTPATIENT)
Dept: GASTROENTEROLOGY | Facility: CLINIC | Age: 64
End: 2019-02-26

## 2019-02-26 NOTE — TELEPHONE ENCOUNTER
PATIENT CALLED RESCHEDULED COLONOSCOPY FOR 3-22-19. ARRIVE AT 8 AM. UPDATED HEALTH HISTORY. PAPER WORK  MAILED TO UPDATE CHART.

## 2019-03-07 ENCOUNTER — PREP FOR SURGERY (OUTPATIENT)
Dept: OTHER | Facility: HOSPITAL | Age: 64
End: 2019-03-07

## 2019-03-07 DIAGNOSIS — K62.5 RECTAL BLEED: Primary | ICD-10-CM

## 2019-03-07 RX ORDER — SODIUM CHLORIDE, SODIUM LACTATE, POTASSIUM CHLORIDE, CALCIUM CHLORIDE 600; 310; 30; 20 MG/100ML; MG/100ML; MG/100ML; MG/100ML
30 INJECTION, SOLUTION INTRAVENOUS CONTINUOUS
Status: CANCELLED | OUTPATIENT
Start: 2019-03-22

## 2019-03-08 PROBLEM — K62.5 RECTAL BLEED: Status: ACTIVE | Noted: 2019-03-08

## 2019-03-11 ENCOUNTER — HOSPITAL ENCOUNTER (OUTPATIENT)
Dept: ULTRASOUND IMAGING | Facility: HOSPITAL | Age: 64
Discharge: HOME | End: 2019-03-11
Admitting: NURSE PRACTITIONER

## 2019-03-11 ENCOUNTER — HOSPITAL ENCOUNTER (OUTPATIENT)
Dept: ULTRASOUND IMAGING | Facility: HOSPITAL | Age: 64
Discharge: HOME | End: 2019-03-11

## 2019-03-11 PROCEDURE — 76706 US ABDL AORTA SCREEN AAA: CPT

## 2019-03-11 PROCEDURE — 76705 ECHO EXAM OF ABDOMEN: CPT

## 2019-03-13 DIAGNOSIS — R93.2 ABNORMAL LIVER ULTRASOUND: ICD-10-CM

## 2019-03-13 DIAGNOSIS — R74.8 ELEVATED LIVER ENZYMES: Primary | ICD-10-CM

## 2019-03-13 DIAGNOSIS — R17 ELEVATED BILIRUBIN: ICD-10-CM

## 2019-03-15 ENCOUNTER — HOSPITAL ENCOUNTER (OUTPATIENT)
Facility: HOSPITAL | Age: 64
Setting detail: HOSPITAL OUTPATIENT SURGERY
Discharge: HOME | End: 2019-03-15
Attending: INTERNAL MEDICINE | Admitting: INTERNAL MEDICINE

## 2019-03-15 ENCOUNTER — ANESTHESIA EVENT (OUTPATIENT)
Dept: GASTROENTEROLOGY | Facility: HOSPITAL | Age: 64
End: 2019-03-15

## 2019-03-15 ENCOUNTER — ANESTHESIA (OUTPATIENT)
Dept: GASTROENTEROLOGY | Facility: HOSPITAL | Age: 64
End: 2019-03-15

## 2019-03-15 VITALS
TEMPERATURE: 98.9 F | RESPIRATION RATE: 16 BRPM | HEART RATE: 52 BPM | SYSTOLIC BLOOD PRESSURE: 118 MMHG | WEIGHT: 234.9 LBS | BODY MASS INDEX: 29.21 KG/M2 | DIASTOLIC BLOOD PRESSURE: 68 MMHG | OXYGEN SATURATION: 98 % | HEIGHT: 75 IN

## 2019-03-15 DIAGNOSIS — K62.5 RECTAL BLEED: ICD-10-CM

## 2019-03-15 PROCEDURE — 88305 TISSUE EXAM BY PATHOLOGIST: CPT | Performed by: INTERNAL MEDICINE

## 2019-03-15 PROCEDURE — 45385 COLONOSCOPY W/LESION REMOVAL: CPT | Performed by: INTERNAL MEDICINE

## 2019-03-15 PROCEDURE — 25010000002 PROPOFOL 10 MG/ML EMULSION: Performed by: NURSE ANESTHETIST, CERTIFIED REGISTERED

## 2019-03-15 RX ORDER — PROPOFOL 10 MG/ML
VIAL (ML) INTRAVENOUS AS NEEDED
Status: DISCONTINUED | OUTPATIENT
Start: 2019-03-15 | End: 2019-03-15 | Stop reason: SURG

## 2019-03-15 RX ORDER — ONDANSETRON 2 MG/ML
4 INJECTION INTRAMUSCULAR; INTRAVENOUS ONCE AS NEEDED
Status: DISCONTINUED | OUTPATIENT
Start: 2019-03-15 | End: 2019-03-15 | Stop reason: HOSPADM

## 2019-03-15 RX ORDER — SODIUM CHLORIDE, SODIUM LACTATE, POTASSIUM CHLORIDE, CALCIUM CHLORIDE 600; 310; 30; 20 MG/100ML; MG/100ML; MG/100ML; MG/100ML
30 INJECTION, SOLUTION INTRAVENOUS CONTINUOUS
Status: DISCONTINUED | OUTPATIENT
Start: 2019-03-22 | End: 2019-03-15 | Stop reason: HOSPADM

## 2019-03-15 RX ORDER — LIDOCAINE HYDROCHLORIDE 20 MG/ML
INJECTION, SOLUTION INFILTRATION; PERINEURAL AS NEEDED
Status: DISCONTINUED | OUTPATIENT
Start: 2019-03-15 | End: 2019-03-15 | Stop reason: SURG

## 2019-03-15 RX ORDER — PROPOFOL 10 MG/ML
VIAL (ML) INTRAVENOUS CONTINUOUS PRN
Status: DISCONTINUED | OUTPATIENT
Start: 2019-03-15 | End: 2019-03-15 | Stop reason: SURG

## 2019-03-15 RX ADMIN — LIDOCAINE HYDROCHLORIDE 60 MG: 20 INJECTION, SOLUTION INFILTRATION; PERINEURAL at 13:16

## 2019-03-15 RX ADMIN — PROPOFOL 200 MCG/KG/MIN: 10 INJECTION, EMULSION INTRAVENOUS at 13:16

## 2019-03-15 RX ADMIN — SODIUM CHLORIDE, POTASSIUM CHLORIDE, SODIUM LACTATE AND CALCIUM CHLORIDE 30 ML/HR: 600; 310; 30; 20 INJECTION, SOLUTION INTRAVENOUS at 11:30

## 2019-03-15 RX ADMIN — PROPOFOL 50 MG: 10 INJECTION, EMULSION INTRAVENOUS at 13:16

## 2019-03-15 NOTE — ANESTHESIA POSTPROCEDURE EVALUATION
"Patient: Miles Pierre    Procedure Summary     Date:  03/15/19 Room / Location:   [facility]    Anesthesia Start:  1312 Anesthesia Stop:  1333    Procedure:  COLONOSCOPY to cecum:  cold snare polypectomies (N/A ) Diagnosis:       Rectal bleed      (Rectal bleed [K62.5])    Surgeon:  Sunil Farr MD Provider:  Rafat Stoll MD    Anesthesia Type:  MAC ASA Status:  3          Anesthesia Type: MAC  Last vitals  BP   118/68 (03/15/19 1352)   Temp   37.2 °C (98.9 °F) (03/15/19 1119)   Pulse   52 (03/15/19 1352)   Resp   16 (03/15/19 1352)     SpO2   98 % (03/15/19 1352)     Post Anesthesia Care and Evaluation    Patient location during evaluation: PACU  Patient participation: complete - patient participated  Level of consciousness: awake  Pain score: 0  Pain management: adequate  Airway patency: patent  Anesthetic complications: No anesthetic complications  PONV Status: none  Cardiovascular status: acceptable  Respiratory status: acceptable  Hydration status: acceptable    Comments: /68 (BP Location: Left arm, Patient Position: Sitting)   Pulse 52   Temp 37.2 °C (98.9 °F) (Oral)   Resp 16   Ht 190.5 cm (75\")   Wt 107 kg (234 lb 14.4 oz)   SpO2 98%   BMI 29.36 kg/m²       "

## 2019-03-15 NOTE — DISCHARGE INSTRUCTIONS

## 2019-03-15 NOTE — ANESTHESIA PREPROCEDURE EVALUATION
Anesthesia Evaluation     Patient summary reviewed and Nursing notes reviewed                Airway   Mallampati: I  TM distance: >3 FB  Neck ROM: full  No difficulty expected  Dental - normal exam     Pulmonary - normal exam   (+) a smoker Former, sleep apnea,   Cardiovascular - normal exam    (+) hypertension, hyperlipidemia,       Neuro/Psych- negative ROS  GI/Hepatic/Renal/Endo    (+)  GI bleeding,   (-)  obesity, morbid obesity    Musculoskeletal     Abdominal  - normal exam    Bowel sounds: normal.   Substance History - negative use     OB/GYN negative ob/gyn ROS         Other   (+) arthritis   history of cancer                    Anesthesia Plan    ASA 3     MAC     Anesthetic plan, all risks, benefits, and alternatives have been provided, discussed and informed consent has been obtained with: patient.

## 2019-03-15 NOTE — H&P
CC: Here for endoscopic evaluation.     HPI: Screening, incidental rectal bleed.    Past Medical History:   Diagnosis Date   • ED (erectile dysfunction)    • Hyperlipidemia    • Hypertension    • Skin cancer 05/2017    Basal cell carcinoma       Past Surgical History:   Procedure Laterality Date   • COLONOSCOPY     • SKIN CANCER EXCISION      basal cell       Prior to Admission medications    Medication Sig Start Date End Date Taking? Authorizing Provider   amLODIPine (NORVASC) 10 MG tablet Take 1 tablet by mouth Daily. 2/12/19  Yes Rowena Motley APRN   losartan (COZAAR) 25 MG tablet Take 1 tablet by mouth Daily. 2/14/19  Yes Rowena Motley APRN       No Known Allergies    Family History   Problem Relation Age of Onset   • Hypertension Father    • Hypertension Brother    • Cancer Brother         Liver   • Cancer Brother         Bladder       OBJECTIVE:    Patient's vital signs reviewed. No acute distress.     Chest is clear, no wheezing or rales. Normal symmetric air entry throughout both lung fields. No chest wall deformities or tenderness.    S1 and S2 normal, no murmurs, clicks, gallops or rubs. Regular rate and rhythm. Chest is clear; no wheezes or rales. No edema or JVD.    The abdomen is soft without tenderness, guarding, mass, rebound or organomegaly. Bowel sounds are normal. No CVA tenderness or inguinal adenopathy noted.    Patient is alert, oriented and with an intact memory.    Assessment: Screening, incidental rectal bleed.     Plan: Colonoscopy.

## 2019-03-18 LAB
CYTO UR: NORMAL
LAB AP CASE REPORT: NORMAL
PATH REPORT.FINAL DX SPEC: NORMAL
PATH REPORT.GROSS SPEC: NORMAL

## 2019-04-29 ENCOUNTER — OFFICE VISIT (OUTPATIENT)
Dept: INTERNAL MEDICINE | Age: 64
End: 2019-04-29

## 2019-04-29 VITALS
HEIGHT: 75 IN | SYSTOLIC BLOOD PRESSURE: 136 MMHG | TEMPERATURE: 97 F | OXYGEN SATURATION: 96 % | DIASTOLIC BLOOD PRESSURE: 84 MMHG | WEIGHT: 234 LBS | HEART RATE: 60 BPM | BODY MASS INDEX: 29.09 KG/M2

## 2019-04-29 DIAGNOSIS — K76.0 FATTY LIVER: ICD-10-CM

## 2019-04-29 DIAGNOSIS — I10 ESSENTIAL HYPERTENSION: Primary | ICD-10-CM

## 2019-04-29 DIAGNOSIS — G47.33 OSA (OBSTRUCTIVE SLEEP APNEA): ICD-10-CM

## 2019-04-29 DIAGNOSIS — E78.5 HYPERLIPIDEMIA, UNSPECIFIED HYPERLIPIDEMIA TYPE: ICD-10-CM

## 2019-04-29 DIAGNOSIS — Z12.5 PROSTATE CANCER SCREENING: ICD-10-CM

## 2019-04-29 LAB
ALBUMIN SERPL-MCNC: 4.7 G/DL (ref 3.5–5.2)
ALBUMIN/GLOB SERPL: 1.7 G/DL
ALP SERPL-CCNC: 115 U/L (ref 39–117)
ALT SERPL-CCNC: 48 U/L (ref 1–41)
AST SERPL-CCNC: 49 U/L (ref 1–40)
BILIRUB SERPL-MCNC: 1.7 MG/DL (ref 0.2–1.2)
BUN SERPL-MCNC: 20 MG/DL (ref 8–23)
BUN/CREAT SERPL: 22 (ref 7–25)
CALCIUM SERPL-MCNC: 9.5 MG/DL (ref 8.6–10.5)
CHLORIDE SERPL-SCNC: 101 MMOL/L (ref 98–107)
CHOLEST SERPL-MCNC: 179 MG/DL (ref 0–200)
CHOLEST/HDLC SERPL: 4.71 {RATIO}
CO2 SERPL-SCNC: 26.2 MMOL/L (ref 22–29)
CREAT SERPL-MCNC: 0.91 MG/DL (ref 0.76–1.27)
GLOBULIN SER CALC-MCNC: 2.7 GM/DL
GLUCOSE SERPL-MCNC: 116 MG/DL (ref 65–99)
HDLC SERPL-MCNC: 38 MG/DL (ref 40–60)
LDLC SERPL CALC-MCNC: 115 MG/DL (ref 0–100)
POTASSIUM SERPL-SCNC: 4.4 MMOL/L (ref 3.5–5.2)
PROT SERPL-MCNC: 7.4 G/DL (ref 6–8.5)
PSA SERPL-MCNC: 1.01 NG/ML (ref 0–4)
SODIUM SERPL-SCNC: 140 MMOL/L (ref 136–145)
TRIGL SERPL-MCNC: 131 MG/DL (ref 0–150)
VLDLC SERPL CALC-MCNC: 26.2 MG/DL

## 2019-04-29 PROCEDURE — 99214 OFFICE O/P EST MOD 30 MIN: CPT | Performed by: NURSE PRACTITIONER

## 2019-04-29 NOTE — PROGRESS NOTES
[facility] [place] [facility]  Rowena Pierre / 63 y.o. / male  04/29/2019      ASSESSMENT & PLAN:    Problem List Items Addressed This Visit        Cardiovascular and Mediastinum    Hypertension - Primary    Relevant Medications    amLODIPine (NORVASC) 10 MG tablet    Other Relevant Orders    Comprehensive Metabolic Panel    Hyperlipidemia    Relevant Orders    Lipid Panel With / Chol / HDL Ratio       Digestive    Fatty liver    Relevant Orders    Comprehensive Metabolic Panel      Other Visit Diagnoses     AUDRA (obstructive sleep apnea)        Relevant Orders    Ambulatory Referral to Sleep Medicine    Prostate cancer screening        Relevant Orders    PSA Screen        Orders Placed This Encounter   Procedures   • Lipid Panel With / Chol / HDL Ratio   • PSA Screen   • Comprehensive Metabolic Panel   • Ambulatory Referral to Sleep Medicine     No orders of the defined types were placed in this encounter.      Summary/Discussion:    1. Essential hypertension  Blood pressure still suboptimally controlled but better than previous.  Patient does not want to start any additional medication at this time.  Discussed with patient need to continue to work on weight loss and dietary changes, will continue to monitor blood pressure at home and notify me if consistently greater than 140/90, at which point additional medication will need to be added.  Will check labs today and adjust medication as necessary.  Follow-up blood pressure recheck in 4 months.    - Comprehensive Metabolic Panel    2. Hyperlipidemia, unspecified hyperlipidemia type    - Lipid Panel With / Chol / HDL Ratio    3. AUDRA (obstructive sleep apnea)  Needs referral to sleep medicine for evaluation of history of sleep apnea, likely updated sleep study.    - Ambulatory Referral to Sleep Medicine    4. Fatty liver  Recent ultrasound results consistent with fatty liver.  Discussed with patient need to work on dietary changes and weight loss  "to help improve this and reduce risk of progression to further liver damage or disease.    - Comprehensive Metabolic Panel    5. Prostate cancer screening    - PSA Screen      Return in about 4 months (around 8/29/2019) for Next scheduled follow up.    ____________________________________________________________________    MEDICATIONS  Current Outpatient Medications   Medication Sig Dispense Refill   • amLODIPine (NORVASC) 10 MG tablet Take 1 tablet by mouth Daily. 90 tablet 1     No current facility-administered medications for this visit.           VITALS:    Visit Vitals  /84   Pulse 60   Temp 97 °F (36.1 °C)   Ht 190.5 cm (75\")   Wt 106 kg (234 lb)   SpO2 96%   BMI 29.25 kg/m²       BP Readings from Last 3 Encounters:   04/29/19 136/84   03/15/19 118/68   02/14/19 142/80     Wt Readings from Last 3 Encounters:   04/29/19 106 kg (234 lb)   03/15/19 107 kg (234 lb 14.4 oz)   02/14/19 108 kg (237 lb 6.4 oz)      Body mass index is 29.25 kg/m².    CC:  Main reason(s) for today's visit: Hypertension (6 wk f/u)      HPI: Patient here for follow-up hypertension, elevated liver enzymes, ultrasound results.    Hypertension: Patient here for follow-up of elevated blood pressure. He is exercising and is adherent to low salt diet.  Blood pressure readings at home are approximately 140/80.  Cardiac symptoms none. Patient denies chest pain, dyspnea, exertional chest pressure/discomfort, fatigue and lower extremity edema.  Cardiovascular risk factors: advanced age (older than 55 for men, 65 for women), dyslipidemia, hypertension, male gender and sedentary lifestyle. Use of agents associated with hypertension: none. History of target organ damage: none.    He has history of sleep apnea, although has not been compliant with therapy for some years now.  No recent sleep study.    Review of labs from last visit demonstrate hemoglobin A1c in the prediabetic range.  Patient has had a history of elevated liver enzymes, recently " ordered ultrasound of liver which demonstrated mild fatty infiltration.     He reports he has been actively working on weight loss, has increased his activity to walking approximately 15 to 20 miles per week.  He is still somewhat noncompliant with avoidance of excessive sweets and sugars in his diet.       Patient Care Team:  Rowena Motley APRN as PCP - General (Internal Medicine)    ____________________________________________________________________    REVIEW OF SYSTEMS    Review of Systems   Constitutional: Negative for activity change, appetite change and unexpected weight change.   HENT: Negative for tinnitus.    Eyes: Negative for visual disturbance.   Respiratory: Negative for cough, chest tightness and shortness of breath.    Cardiovascular: Negative for chest pain, palpitations and leg swelling.   Neurological: Negative for dizziness, light-headedness and headaches.         PHYSICAL EXAMINATION    Physical Exam   Constitutional: He is oriented to person, place, and time. Vital signs are normal. He appears well-developed and well-nourished. He is cooperative. He does not appear ill. No distress.   Cardiovascular: Normal rate, regular rhythm, S1 normal, S2 normal and normal heart sounds.   No murmur heard.  Pulmonary/Chest: Effort normal and breath sounds normal. He has no decreased breath sounds. He has no wheezes. He has no rhonchi. He has no rales.   Neurological: He is alert and oriented to person, place, and time.   Skin: Skin is warm, dry and intact.   Psychiatric: He has a normal mood and affect. His speech is normal and behavior is normal. Judgment and thought content normal. Cognition and memory are normal.   Nursing note and vitals reviewed.      REVIEWED DATA:    Labs:     Lab Results   Component Value Date     02/14/2019    K 4.9 02/14/2019    AST 57 (H) 02/14/2019    ALT 55 (H) 02/14/2019    BUN 15 02/14/2019    CREATININE 0.77 02/14/2019    CREATININE 0.82 10/08/2018    CREATININE  0.83 08/30/2017    EGFRIFNONA 95 02/14/2019    EGFRIFAFRI 110 02/14/2019       Lab Results   Component Value Date    HGBA1C 5.8 (H) 02/14/2019    HGBA1C 6.00 (H) 07/24/2017    HGBA1C 5.5 09/04/2015       Lab Results   Component Value Date     (H) 11/28/2017     (H) 12/14/2016    HDL 47 11/28/2017    HDL 55 12/14/2016    TRIG 105 11/28/2017    TRIG 103 12/14/2016       Lab Results   Component Value Date    TSH 1.240 08/30/2017    FREET4 1.09 08/30/2017       Lab Results   Component Value Date    WBC 5.16 07/25/2018    HGB 14.6 07/25/2018    HGB 14.0 08/30/2017    HGB 15.0 12/14/2016     07/25/2018       Lab Results   Component Value Date    PROTEIN Negative 11/28/2017    GLUCOSEU Negative 11/28/2017    BLOODU Negative 11/28/2017    NITRITEU Negative 11/28/2017    LEUKOCYTESUR Negative 11/28/2017       Imaging:         Medical Tests:         Summary of old records / correspondence / consultant report:         Request outside records:         ALLERGIES  No Known Allergies     PFSH:     The following portions of the patient's history were reviewed and updated as appropriate: Allergies / Current Medications / Past Medical History / Surgical History / Social History / Family History    PROBLEM LIST   Patient Active Problem List   Diagnosis   • Impaired fasting glucose   • Hypertension   • Hyperlipidemia   • Impotence of organic origin   • Osteoarthritis of knee   • Tear of medial meniscus of knee   • Obesity (BMI 30-39.9)   • Routine health maintenance   • Nocturia   • Abnormal laboratory test   • Other sleep apnea   • Lower GI bleed   • Skin cancer   • Abnormal transaminases   • Rectal bleed   • Fatty liver       PAST MEDICAL HISTORY  Past Medical History:   Diagnosis Date   • ED (erectile dysfunction)    • Hyperlipidemia    • Hypertension    • Skin cancer 05/2017    Basal cell carcinoma       SURGICAL HISTORY  Past Surgical History:   Procedure Laterality Date   • COLONOSCOPY     • COLONOSCOPY N/A  3/15/2019    Procedure: COLONOSCOPY to cecum:  cold snare polypectomies;  Surgeon: Sunil Farr MD;  Location: [facility] ENDOSCOPY;  Service: Gastroenterology   • SKIN CANCER EXCISION      basal cell       SOCIAL HISTORY  Social History     Socioeconomic History   • Marital status: Single     Spouse name: Not on file   • Number of children: 3   • Years of education: Not on file   • Highest education level: Not on file   Occupational History   • Occupation: 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   Tobacco Use   • Smoking status: Former Smoker     Packs/day: 0.50     Years: 23.00     Pack years: 11.50     Types: Cigars     Start date:      Last attempt to quit:      Years since quittin.3   • Smokeless tobacco: Never Used   Substance and Sexual Activity   • Alcohol use: No   • Drug use: No   • Sexual activity: No   Social History Narrative    Single       FAMILY HISTORY  Family History   Problem Relation Age of Onset   • Hypertension Father    • Hypertension Brother    • Cancer Brother         Liver   • Cancer Brother         Bladder         **Dragon Disclaimer:   Much of this encounter note is an electronic transcription/translation of spoken language to printed text. The electronic translation of spoken language may permit erroneous, or at times, nonsensical words or phrases to be inadvertently transcribed. Although I have reviewed the note for such errors, some may still exist.

## 2019-05-15 ENCOUNTER — APPOINTMENT (OUTPATIENT)
Dept: SLEEP MEDICINE | Facility: HOSPITAL | Age: 64
End: 2019-05-15

## 2019-08-20 DIAGNOSIS — I10 ESSENTIAL HYPERTENSION: ICD-10-CM

## 2019-08-20 RX ORDER — AMLODIPINE BESYLATE 10 MG/1
TABLET ORAL
Qty: 90 TABLET | Refills: 0 | Status: SHIPPED | OUTPATIENT
Start: 2019-08-20 | End: 2019-08-26

## 2019-08-26 ENCOUNTER — OFFICE VISIT (OUTPATIENT)
Dept: INTERNAL MEDICINE | Age: 64
End: 2019-08-26

## 2019-08-26 VITALS
OXYGEN SATURATION: 97 % | TEMPERATURE: 97.8 F | SYSTOLIC BLOOD PRESSURE: 138 MMHG | BODY MASS INDEX: 28.82 KG/M2 | HEIGHT: 75 IN | WEIGHT: 231.8 LBS | DIASTOLIC BLOOD PRESSURE: 88 MMHG | HEART RATE: 92 BPM

## 2019-08-26 DIAGNOSIS — I10 ESSENTIAL HYPERTENSION: Primary | ICD-10-CM

## 2019-08-26 DIAGNOSIS — E78.5 HYPERLIPIDEMIA, UNSPECIFIED HYPERLIPIDEMIA TYPE: ICD-10-CM

## 2019-08-26 LAB
ALBUMIN SERPL-MCNC: 4.4 G/DL (ref 3.5–5.2)
ALBUMIN/GLOB SERPL: 1.5 G/DL
ALP SERPL-CCNC: 152 U/L (ref 39–117)
ALT SERPL-CCNC: 41 U/L (ref 1–41)
AST SERPL-CCNC: 53 U/L (ref 1–40)
BILIRUB SERPL-MCNC: 2.1 MG/DL (ref 0.2–1.2)
BUN SERPL-MCNC: 14 MG/DL (ref 8–23)
BUN/CREAT SERPL: 18.2 (ref 7–25)
CALCIUM SERPL-MCNC: 8.9 MG/DL (ref 8.6–10.5)
CHLORIDE SERPL-SCNC: 101 MMOL/L (ref 98–107)
CHOLEST SERPL-MCNC: 169 MG/DL (ref 0–200)
CHOLEST/HDLC SERPL: 6.26 {RATIO}
CO2 SERPL-SCNC: 26.1 MMOL/L (ref 22–29)
CREAT SERPL-MCNC: 0.77 MG/DL (ref 0.76–1.27)
GLOBULIN SER CALC-MCNC: 3 GM/DL
GLUCOSE SERPL-MCNC: 116 MG/DL (ref 65–99)
HDLC SERPL-MCNC: 27 MG/DL (ref 40–60)
LDLC SERPL CALC-MCNC: 112 MG/DL (ref 0–100)
POTASSIUM SERPL-SCNC: 4.7 MMOL/L (ref 3.5–5.2)
PROT SERPL-MCNC: 7.4 G/DL (ref 6–8.5)
SODIUM SERPL-SCNC: 140 MMOL/L (ref 136–145)
TRIGL SERPL-MCNC: 150 MG/DL (ref 0–150)
VLDLC SERPL CALC-MCNC: 30 MG/DL

## 2019-08-26 PROCEDURE — 99213 OFFICE O/P EST LOW 20 MIN: CPT | Performed by: NURSE PRACTITIONER

## 2019-08-26 RX ORDER — AMLODIPINE BESYLATE AND BENAZEPRIL HYDROCHLORIDE 5; 20 MG/1; MG/1
1 CAPSULE ORAL DAILY
Qty: 30 CAPSULE | Refills: 3 | Status: SHIPPED | OUTPATIENT
Start: 2019-08-26 | End: 2019-11-08 | Stop reason: ALTCHOICE

## 2019-08-26 NOTE — PROGRESS NOTES
"[facility] [place] [facility]  Kimmyeduardo Son APRSUZI      Miles Pierre / 63 y.o. / male  08/26/2019      ASSESSMENT & PLAN:    Problem List Items Addressed This Visit        Cardiovascular and Mediastinum    Hypertension - Primary    Relevant Medications    amLODIPine-benazepril (LOTREL 5-20) 5-20 MG per capsule    Other Relevant Orders    Lipid Panel With / Chol / HDL Ratio    Comprehensive Metabolic Panel    Hyperlipidemia    Relevant Orders    Lipid Panel With / Chol / HDL Ratio    Comprehensive Metabolic Panel        Orders Placed This Encounter   Procedures   • Lipid Panel With / Chol / HDL Ratio   • Comprehensive Metabolic Panel     New Medications Ordered This Visit   Medications   • amLODIPine-benazepril (LOTREL 5-20) 5-20 MG per capsule     Sig: Take 1 capsule by mouth Daily.     Dispense:  30 capsule     Refill:  3       Summary/Discussion:  1. BP continues to be suboptimally controlled despite patient's efforts at lifestyle modifications and taking medication as prescribed.  He reports his readings at home are typically systolic 140s.  We have decided to change his amlodipine over to a amlodipine benazepril combination medication, to be taken once daily.  I have advised the patient on possible side effects.  He is to monitor his blood pressure readings 1 or 2 times daily at home, and call us in 2 weeks with his reported readings. Discussed importance of lifestyle modifications to aid in HTN control: low sodium diet, avoid tobacco use, decrease alcohol and caffeine use, increase aerobic exercise until 4-5 days/week for 30 min, maintain healthy diet.   2. Lipid/cholesterol/fatty liver: Last lipid panel checked 4 months ago. Patient was advised on the following by PCP and re-instructed today: Lipid panel shows that HDL \"good\" cholesterol is slightly low, LDL \"bad\" cholesterol is slightly elevated. You can increase HDL by increasing exercise and consuming good fats, including fish, nuts, etc. You can decrease " "your LDL levels by watching your diet (decreasing foods high in saturated fats, including red meat and dairy products) and increasing your fiber intake.   3.  Check CMP and cholesterol panel today, patient is fasting.      Return in about 3 months (around 11/19/2019) for Follow up with ZOE Juárez, Recheck HTN.  ____________________________________________________________________    MEDICATIONS  Current Outpatient Medications   Medication Sig Dispense Refill   • amLODIPine-benazepril (LOTREL 5-20) 5-20 MG per capsule Take 1 capsule by mouth Daily. 30 capsule 3     No current facility-administered medications for this visit.        VITALS    Visit Vitals  /88   Pulse 92   Temp 97.8 °F (36.6 °C)   Ht 190.5 cm (75\")   Wt 105 kg (231 lb 12.8 oz)   SpO2 97%   BMI 28.97 kg/m²       BP Readings from Last 3 Encounters:   08/26/19 138/88   04/29/19 136/84   03/15/19 118/68     Wt Readings from Last 3 Encounters:   08/26/19 105 kg (231 lb 12.8 oz)   04/29/19 106 kg (234 lb)   03/15/19 107 kg (234 lb 14.4 oz)      Body mass index is 28.97 kg/m².    CC:  Main reason(s) for today's visit: Follow-up for hypertension   HPI:     Chronic essential hypertension:  Since prior visit: compliant with medication(s), checks blood pressure occasionally, denies significant problems with medication(s), SBP < 140 and SBP < 130.  He reports he has not noticed any improvement in his blood pressure since his prior visit.  He denies symptoms of chest pain, palpitations, headaches, blurred vision, lower extremity swelling.  Most recent in-office blood pressure readings:   BP Readings from Last 3 Encounters:   08/26/19 138/88   04/29/19 136/84   03/15/19 118/68     Chronic hyperlipidemia:  Current therapy include low fat/cholesterol diet, no prior medication.    Most recent labs:   Lab Results   Component Value Date     (H) 04/29/2019     (H) 11/28/2017     (H) 12/14/2016    HDL 38 (L) 04/29/2019    HDL 47 " 11/28/2017    TRIG 131 04/29/2019    CHOLHDLRATIO 4.71 04/29/2019       Patient Care Team:  Rowena Motley APRN as PCP - General (Internal Medicine)  ____________________________________________________________________      REVIEW OF SYSTEMS    Review of Systems  As noted per HPI  Constitutional neg  Resp neg  CV neg     PHYSICAL EXAMINATION    Physical Exam  Constitutional  No distress  Cardiovascular Rate  normal . Rhythm: regular . Heart sounds:  normal  Pulmonary/Chest  Effort normal. Breath sounds:  normal  Psychiatric  Alert. Judgment and thought content normal. Mood normal    REVIEWED DATA:    Labs:   Lab Results   Component Value Date     04/29/2019    K 4.4 04/29/2019    AST 49 (H) 04/29/2019    ALT 48 (H) 04/29/2019    BUN 20 04/29/2019    CREATININE 0.91 04/29/2019    CREATININE 0.77 02/14/2019    CREATININE 0.82 10/08/2018    EGFRIFNONA 84 04/29/2019    EGFRIFAFRI 101 04/29/2019       Lab Results   Component Value Date    HGBA1C 5.8 (H) 02/14/2019    HGBA1C 6.00 (H) 07/24/2017    HGBA1C 5.5 09/04/2015       Lab Results   Component Value Date     (H) 04/29/2019     (H) 11/28/2017     (H) 12/14/2016    HDL 38 (L) 04/29/2019    HDL 47 11/28/2017    HDL 55 12/14/2016    TRIG 131 04/29/2019    TRIG 105 11/28/2017    TRIG 103 12/14/2016    CHOLHDLRATIO 4.71 04/29/2019       Lab Results   Component Value Date    TSH 1.240 08/30/2017    FREET4 1.09 08/30/2017          Lab Results   Component Value Date    WBC 5.16 07/25/2018    HGB 14.6 07/25/2018    HGB 14.0 08/30/2017    HGB 15.0 12/14/2016     07/25/2018       Lab Results   Component Value Date    PROTEIN Negative 11/28/2017    GLUCOSEU Negative 11/28/2017    BLOODU Negative 11/28/2017    NITRITEU Negative 11/28/2017    LEUKOCYTESUR Negative 11/28/2017       Imaging:        Medical Tests:        Summary of old records / correspondence / consultant report:        Request outside records:        ALLERGIES  No Known Allergies      PFSH:     The following portions of the patient's history were reviewed and updated as appropriate: Allergies / Current Medications / Past Medical History / Surgical History / Social History / Family History    PROBLEM LIST   Patient Active Problem List   Diagnosis   • Impaired fasting glucose   • Hypertension   • Hyperlipidemia   • Impotence of organic origin   • Osteoarthritis of knee   • Tear of medial meniscus of knee   • Obesity (BMI 30-39.9)   • Routine health maintenance   • Nocturia   • Abnormal laboratory test   • Other sleep apnea   • Lower GI bleed   • Skin cancer   • Abnormal transaminases   • Rectal bleed   • Fatty liver       PAST MEDICAL HISTORY  Past Medical History:   Diagnosis Date   • ED (erectile dysfunction)    • Hyperlipidemia    • Hypertension    • Skin cancer 2017    Basal cell carcinoma       SURGICAL HISTORY  Past Surgical History:   Procedure Laterality Date   • COLONOSCOPY     • COLONOSCOPY N/A 3/15/2019    Procedure: COLONOSCOPY to cecum:  cold snare polypectomies;  Surgeon: Sunil Farr MD;  Location: [facility] ENDOSCOPY;  Service: Gastroenterology   • SKIN CANCER EXCISION      basal cell       SOCIAL HISTORY  Social History     Socioeconomic History   • Marital status: Single     Spouse name: Not on file   • Number of children: 3   • Years of education: Not on file   • Highest education level: Not on file   Occupational History   • Occupation: 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   Tobacco Use   • Smoking status: Former Smoker     Packs/day: 0.50     Years: 23.00     Pack years: 11.50     Types: Cigars     Start date:      Last attempt to quit:      Years since quittin.6   • Smokeless tobacco: Never Used   Substance and Sexual Activity   • Alcohol use: No   • Drug use: No   • Sexual activity: No   Social History Narrative    Single       FAMILY HISTORY  Family History   Problem Relation Age of Onset   • Hypertension Father    • Hypertension Brother    • Cancer  Brother         Liver   • Cancer Brother         Bladder

## 2019-09-03 ENCOUNTER — TELEPHONE (OUTPATIENT)
Dept: INTERNAL MEDICINE | Age: 64
End: 2019-09-03

## 2019-09-03 NOTE — TELEPHONE ENCOUNTER
Blood pressures generally appear within stable ranges.  Any further advice will be dictated by an office visit, review of medications etc.

## 2019-09-03 NOTE — TELEPHONE ENCOUNTER
B/P Readings:    136/62-recent  143/85  132/82  123/71  120/68  141/68  142/80  122/66-oldest    Pt states he has lost 5-6lbs since LDOS 8/26/19.    Pls advise.    Pt can be reached #[phone].

## 2019-09-24 ENCOUNTER — TELEPHONE (OUTPATIENT)
Dept: INTERNAL MEDICINE | Age: 64
End: 2019-09-24

## 2019-09-30 ENCOUNTER — OFFICE VISIT (OUTPATIENT)
Dept: INTERNAL MEDICINE | Age: 64
End: 2019-09-30

## 2019-09-30 VITALS
HEART RATE: 70 BPM | TEMPERATURE: 97.8 F | DIASTOLIC BLOOD PRESSURE: 78 MMHG | OXYGEN SATURATION: 98 % | BODY MASS INDEX: 28.23 KG/M2 | WEIGHT: 227 LBS | HEIGHT: 75 IN | SYSTOLIC BLOOD PRESSURE: 134 MMHG

## 2019-09-30 DIAGNOSIS — I10 ESSENTIAL HYPERTENSION: Primary | ICD-10-CM

## 2019-09-30 DIAGNOSIS — R45.86 MOOD SWINGS: ICD-10-CM

## 2019-09-30 DIAGNOSIS — R53.82 CHRONIC FATIGUE: ICD-10-CM

## 2019-09-30 PROCEDURE — 99214 OFFICE O/P EST MOD 30 MIN: CPT | Performed by: NURSE PRACTITIONER

## 2019-09-30 NOTE — PROGRESS NOTES
[facility] – [place] INTERNAL MEDICINE  ZOE Valiente      Miles Pierre / 63 y.o. / male  09/30/2019    ASSESSMENT & PLAN:    Problem List Items Addressed This Visit        Cardiovascular and Mediastinum    Hypertension - Primary    Relevant Medications    amLODIPine-benazepril (LOTREL 5-20) 5-20 MG per capsule    Other Relevant Orders    CBC and Differential    Comprehensive metabolic panel    Testosterone, free, total    Vitamin B12      Other Visit Diagnoses     Chronic fatigue        Relevant Orders    CBC and Differential    Comprehensive metabolic panel    Testosterone, free, total    Vitamin B12    Mood swings        Relevant Orders    Comprehensive metabolic panel    Testosterone, free, total        Orders Placed This Encounter   Procedures   • Comprehensive metabolic panel   • Testosterone, free, total   • Vitamin B12   • CBC and Differential     No orders of the defined types were placed in this encounter.      Summary/Discussion:  1. Essential hypertension  -Continue current medication as prescribed, low-sodium diet, decrease caffeine, continue with weight loss and exercise daily.  - CBC and Differential  - Comprehensive metabolic panel  - Testosterone, free, total  - Vitamin B12    2. Chronic fatigue  -Check labs, unlikely side effect of blood pressure medication, side effects were reviewed with patient in office visit today.  - CBC and Differential  - Comprehensive metabolic panel  - Testosterone, free, total  - Vitamin B12    3. Mood swings  - Comprehensive metabolic panel  - Testosterone, free, total    Return if symptoms worsen or fail to improve, for Next scheduled follow up.  ____________________________________________________________________    MEDICATIONS  Current Outpatient Medications   Medication Sig Dispense Refill   • amLODIPine-benazepril (LOTREL 5-20) 5-20 MG per capsule Take 1 capsule by mouth Daily. 30 capsule 3     No current facility-administered medications for this visit.   "      VITALS    Visit Vitals  /78   Pulse 70   Temp 97.8 °F (36.6 °C) (Temporal)   Ht 190.5 cm (75\")   Wt 103 kg (227 lb)   SpO2 98%   BMI 28.37 kg/m²       BP Readings from Last 3 Encounters:   09/30/19 134/78   08/26/19 138/88   04/29/19 136/84     Wt Readings from Last 3 Encounters:   09/30/19 103 kg (227 lb)   08/26/19 105 kg (231 lb 12.8 oz)   04/29/19 106 kg (234 lb)      Body mass index is 28.37 kg/m².    CC:  Main reason(s) for today's visit: Follow-up for hypertension    HPI:   Chronic essential hypertension:  Since prior visit: compliant with medication(s), checks blood pressure occasionally, c/o fatigue, SBP < 140 and SBP < 130. Currently taking combination amlodipine-benazepril daily. He is exercising, and is adherent to low sodium diet. BP readings at home are more elvated than in office, but he believes he was incorrectly placing his cuff. Proper use was reviewed with him.  He denies symptoms of chest pain/pressure, dyspnea, swelling, vision impairment. He is complaining of fatigue over the past 3 months and mood swings int he past week.  CVD risk factors include: advanced age (>55 for males, >65 for females), dyslipidemia, HTN, obesity (BMI>=30 kg/m2), and sedentary lifestyle. Use of agents associated with HTN: no alcohol use, no tobacco use and caffeine intake: 1 cups of caffeinated coffee per day . He reports that he has lost 7 lbs of weight since our last visit, without trying. Most recent in-office blood pressure readings:   BP Readings from Last 3 Encounters:   09/30/19 134/78   08/26/19 138/88   04/29/19 136/84       Patient Care Team:  Rowena Motley APRN as PCP - General (Internal Medicine)  ____________________________________________________________________      REVIEW OF SYSTEMS    Review of Systems  As noted per HPI  Constitutional neg  Resp neg  CV neg       PHYSICAL EXAMINATION    Physical Exam  Constitutional  No distress  Cardiovascular Rate  normal . Rhythm: regular . Heart " sounds:  Normal  Pulmonary/Chest: Effort normal and breath sounds normal.    Psychiatric  Alert. Judgment and thought content normal. Mood normal    REVIEWED DATA:    Labs:   Lab Results   Component Value Date     08/26/2019    K 4.7 08/26/2019    AST 53 (H) 08/26/2019    ALT 41 08/26/2019    BUN 14 08/26/2019    CREATININE 0.77 08/26/2019    CREATININE 0.91 04/29/2019    CREATININE 0.77 02/14/2019    EGFRIFNONA 101 08/26/2019    EGFRIFAFRI 123 08/26/2019       Lab Results   Component Value Date    HGBA1C 5.8 (H) 02/14/2019    HGBA1C 6.00 (H) 07/24/2017    HGBA1C 5.5 09/04/2015       Lab Results   Component Value Date     (H) 08/26/2019     (H) 04/29/2019     (H) 11/28/2017    HDL 27 (L) 08/26/2019    HDL 38 (L) 04/29/2019    HDL 47 11/28/2017    TRIG 150 08/26/2019    TRIG 131 04/29/2019    TRIG 105 11/28/2017    CHOLHDLRATIO 6.26 08/26/2019    CHOLHDLRATIO 4.71 04/29/2019       Lab Results   Component Value Date    TSH 1.240 08/30/2017    FREET4 1.09 08/30/2017          Lab Results   Component Value Date    WBC 5.16 07/25/2018    HGB 14.6 07/25/2018    HGB 14.0 08/30/2017    HGB 15.0 12/14/2016     07/25/2018       Lab Results   Component Value Date    PROTEIN Negative 11/28/2017    GLUCOSEU Negative 11/28/2017    BLOODU Negative 11/28/2017    NITRITEU Negative 11/28/2017    LEUKOCYTESUR Negative 11/28/2017       Imaging:        Medical Tests:        Summary of old records / correspondence / consultant report:        Request outside records:        ALLERGIES  No Known Allergies     PFSH:     The following portions of the patient's history were reviewed and updated as appropriate: Allergies / Current Medications / Past Medical History / Surgical History / Social History / Family History    PROBLEM LIST   Patient Active Problem List   Diagnosis   • Impaired fasting glucose   • Hypertension   • Hyperlipidemia   • Impotence of organic origin   • Osteoarthritis of knee   • Tear of  medial meniscus of knee   • Obesity (BMI 30-39.9)   • Routine health maintenance   • Nocturia   • Abnormal laboratory test   • Other sleep apnea   • Lower GI bleed   • Skin cancer   • Abnormal transaminases   • Rectal bleed   • Fatty liver       PAST MEDICAL HISTORY  Past Medical History:   Diagnosis Date   • ED (erectile dysfunction)    • Hyperlipidemia    • Hypertension    • Skin cancer 2017    Basal cell carcinoma       SURGICAL HISTORY  Past Surgical History:   Procedure Laterality Date   • COLONOSCOPY     • COLONOSCOPY N/A 3/15/2019    Procedure: COLONOSCOPY to cecum:  cold snare polypectomies;  Surgeon: Sunil Farr MD;  Location: [facility] ENDOSCOPY;  Service: Gastroenterology   • SKIN CANCER EXCISION      basal cell       SOCIAL HISTORY  Social History     Socioeconomic History   • Marital status: Single     Spouse name: Not on file   • Number of children: 3   • Years of education: Not on file   • Highest education level: Not on file   Occupational History   • Occupation: 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   Tobacco Use   • Smoking status: Former Smoker     Packs/day: 0.50     Years: 23.00     Pack years: 11.50     Types: Cigars     Start date:      Last attempt to quit:      Years since quittin.7   • Smokeless tobacco: Never Used   Substance and Sexual Activity   • Alcohol use: No   • Drug use: No   • Sexual activity: No   Social History Narrative    Single       FAMILY HISTORY  Family History   Problem Relation Age of Onset   • Hypertension Father    • Hypertension Brother    • Cancer Brother         Liver   • Cancer Brother         Bladder

## 2019-10-01 LAB
ALBUMIN SERPL-MCNC: 4.2 G/DL (ref 3.5–5.2)
ALBUMIN/GLOB SERPL: 1.3 G/DL
ALP SERPL-CCNC: 160 U/L (ref 39–117)
ALT SERPL-CCNC: 32 U/L (ref 1–41)
AST SERPL-CCNC: 48 U/L (ref 1–40)
BASOPHILS # BLD AUTO: 0.02 10*3/MM3 (ref 0–0.2)
BASOPHILS NFR BLD AUTO: 0.3 % (ref 0–1.5)
BILIRUB SERPL-MCNC: 1.7 MG/DL (ref 0.2–1.2)
BUN SERPL-MCNC: 15 MG/DL (ref 8–23)
BUN/CREAT SERPL: 23.1 (ref 7–25)
CALCIUM SERPL-MCNC: 8.9 MG/DL (ref 8.6–10.5)
CHLORIDE SERPL-SCNC: 104 MMOL/L (ref 98–107)
CO2 SERPL-SCNC: 23.4 MMOL/L (ref 22–29)
CREAT SERPL-MCNC: 0.65 MG/DL (ref 0.76–1.27)
EOSINOPHIL # BLD AUTO: 0.1 10*3/MM3 (ref 0–0.4)
EOSINOPHIL NFR BLD AUTO: 1.7 % (ref 0.3–6.2)
ERYTHROCYTE [DISTWIDTH] IN BLOOD BY AUTOMATED COUNT: 12.5 % (ref 12.3–15.4)
GLOBULIN SER CALC-MCNC: 3.2 GM/DL
GLUCOSE SERPL-MCNC: 98 MG/DL (ref 65–99)
HCT VFR BLD AUTO: 41.1 % (ref 37.5–51)
HGB BLD-MCNC: 13.7 G/DL (ref 13–17.7)
IMM GRANULOCYTES # BLD AUTO: 0.02 10*3/MM3 (ref 0–0.05)
IMM GRANULOCYTES NFR BLD AUTO: 0.3 % (ref 0–0.5)
LYMPHOCYTES # BLD AUTO: 1.37 10*3/MM3 (ref 0.7–3.1)
LYMPHOCYTES NFR BLD AUTO: 23.1 % (ref 19.6–45.3)
MCH RBC QN AUTO: 30.2 PG (ref 26.6–33)
MCHC RBC AUTO-ENTMCNC: 33.3 G/DL (ref 31.5–35.7)
MCV RBC AUTO: 90.7 FL (ref 79–97)
MONOCYTES # BLD AUTO: 0.35 10*3/MM3 (ref 0.1–0.9)
MONOCYTES NFR BLD AUTO: 5.9 % (ref 5–12)
NEUTROPHILS # BLD AUTO: 4.06 10*3/MM3 (ref 1.7–7)
NEUTROPHILS NFR BLD AUTO: 68.7 % (ref 42.7–76)
NRBC BLD AUTO-RTO: 0 /100 WBC (ref 0–0.2)
PLATELET # BLD AUTO: 217 10*3/MM3 (ref 140–450)
POTASSIUM SERPL-SCNC: 4.4 MMOL/L (ref 3.5–5.2)
PROT SERPL-MCNC: 7.4 G/DL (ref 6–8.5)
RBC # BLD AUTO: 4.53 10*6/MM3 (ref 4.14–5.8)
SODIUM SERPL-SCNC: 140 MMOL/L (ref 136–145)
TESTOST FREE SERPL-MCNC: 4.6 PG/ML (ref 6.6–18.1)
TESTOST SERPL-MCNC: 498 NG/DL (ref 264–916)
VIT B12 SERPL-MCNC: 328 PG/ML (ref 211–946)
WBC # BLD AUTO: 5.92 10*3/MM3 (ref 3.4–10.8)

## 2019-10-04 DIAGNOSIS — R89.9 ABNORMAL LABORATORY TEST RESULT: Primary | ICD-10-CM

## 2019-10-05 LAB — TESTOST SERPL-MCNC: 659 NG/DL (ref 264–916)

## 2019-11-08 ENCOUNTER — OFFICE VISIT (OUTPATIENT)
Dept: INTERNAL MEDICINE | Age: 64
End: 2019-11-08

## 2019-11-08 ENCOUNTER — HOSPITAL ENCOUNTER (OUTPATIENT)
Dept: GENERAL RADIOLOGY | Facility: HOSPITAL | Age: 64
Discharge: HOME | End: 2019-11-08
Admitting: NURSE PRACTITIONER

## 2019-11-08 VITALS
BODY MASS INDEX: 28.57 KG/M2 | OXYGEN SATURATION: 99 % | HEART RATE: 74 BPM | HEIGHT: 75 IN | DIASTOLIC BLOOD PRESSURE: 80 MMHG | TEMPERATURE: 98.4 F | WEIGHT: 229.8 LBS | SYSTOLIC BLOOD PRESSURE: 156 MMHG

## 2019-11-08 DIAGNOSIS — R05.9 COUGH: ICD-10-CM

## 2019-11-08 DIAGNOSIS — K76.0 FATTY LIVER: ICD-10-CM

## 2019-11-08 DIAGNOSIS — I10 ESSENTIAL HYPERTENSION: Primary | ICD-10-CM

## 2019-11-08 DIAGNOSIS — R53.83 FATIGUE, UNSPECIFIED TYPE: ICD-10-CM

## 2019-11-08 DIAGNOSIS — R22.31 AXILLARY MASS, RIGHT: ICD-10-CM

## 2019-11-08 DIAGNOSIS — R74.8 ABNORMAL TRANSAMINASES: ICD-10-CM

## 2019-11-08 DIAGNOSIS — E78.5 HYPERLIPIDEMIA, UNSPECIFIED HYPERLIPIDEMIA TYPE: ICD-10-CM

## 2019-11-08 LAB
ALBUMIN SERPL-MCNC: 4.2 G/DL (ref 3.5–5.2)
ALBUMIN/GLOB SERPL: 1.3 G/DL
ALP SERPL-CCNC: 260 U/L (ref 39–117)
ALT SERPL-CCNC: 34 U/L (ref 1–41)
AST SERPL-CCNC: 53 U/L (ref 1–40)
BASOPHILS # BLD AUTO: 0.03 10*3/MM3 (ref 0–0.2)
BASOPHILS NFR BLD AUTO: 0.4 % (ref 0–1.5)
BILIRUB SERPL-MCNC: 1.4 MG/DL (ref 0.2–1.2)
BUN SERPL-MCNC: 15 MG/DL (ref 8–23)
BUN/CREAT SERPL: 23.1 (ref 7–25)
CALCIUM SERPL-MCNC: 8.8 MG/DL (ref 8.6–10.5)
CHLORIDE SERPL-SCNC: 103 MMOL/L (ref 98–107)
CO2 SERPL-SCNC: 25.3 MMOL/L (ref 22–29)
CREAT SERPL-MCNC: 0.65 MG/DL (ref 0.76–1.27)
EOSINOPHIL # BLD AUTO: 0.09 10*3/MM3 (ref 0–0.4)
EOSINOPHIL NFR BLD AUTO: 1.3 % (ref 0.3–6.2)
ERYTHROCYTE [DISTWIDTH] IN BLOOD BY AUTOMATED COUNT: 12.3 % (ref 12.3–15.4)
GLOBULIN SER CALC-MCNC: 3.3 GM/DL
GLUCOSE SERPL-MCNC: 103 MG/DL (ref 65–99)
HCT VFR BLD AUTO: 39.1 % (ref 37.5–51)
HGB BLD-MCNC: 13.1 G/DL (ref 13–17.7)
IMM GRANULOCYTES # BLD AUTO: 0.03 10*3/MM3 (ref 0–0.05)
IMM GRANULOCYTES NFR BLD AUTO: 0.4 % (ref 0–0.5)
LYMPHOCYTES # BLD AUTO: 1.46 10*3/MM3 (ref 0.7–3.1)
LYMPHOCYTES NFR BLD AUTO: 21.4 % (ref 19.6–45.3)
MCH RBC QN AUTO: 29.7 PG (ref 26.6–33)
MCHC RBC AUTO-ENTMCNC: 33.5 G/DL (ref 31.5–35.7)
MCV RBC AUTO: 88.7 FL (ref 79–97)
MONOCYTES # BLD AUTO: 0.37 10*3/MM3 (ref 0.1–0.9)
MONOCYTES NFR BLD AUTO: 5.4 % (ref 5–12)
NEUTROPHILS # BLD AUTO: 4.83 10*3/MM3 (ref 1.7–7)
NEUTROPHILS NFR BLD AUTO: 71.1 % (ref 42.7–76)
NRBC BLD AUTO-RTO: 0 /100 WBC (ref 0–0.2)
PLATELET # BLD AUTO: 250 10*3/MM3 (ref 140–450)
POTASSIUM SERPL-SCNC: 4.5 MMOL/L (ref 3.5–5.2)
PROT SERPL-MCNC: 7.5 G/DL (ref 6–8.5)
RBC # BLD AUTO: 4.41 10*6/MM3 (ref 4.14–5.8)
SODIUM SERPL-SCNC: 140 MMOL/L (ref 136–145)
TSH SERPL DL<=0.005 MIU/L-ACNC: 2.56 UIU/ML (ref 0.27–4.2)
WBC # BLD AUTO: 6.81 10*3/MM3 (ref 3.4–10.8)

## 2019-11-08 PROCEDURE — 99214 OFFICE O/P EST MOD 30 MIN: CPT | Performed by: NURSE PRACTITIONER

## 2019-11-08 PROCEDURE — 71046 X-RAY EXAM CHEST 2 VIEWS: CPT

## 2019-11-08 RX ORDER — AMLODIPINE BESYLATE 5 MG/1
5 TABLET ORAL DAILY
COMMUNITY
End: 2019-11-08 | Stop reason: DRUGHIGH

## 2019-11-08 RX ORDER — AMLODIPINE BESYLATE 10 MG/1
10 TABLET ORAL DAILY
Qty: 90 TABLET | Refills: 1 | Status: SHIPPED | OUTPATIENT
Start: 2019-11-08 | End: 2019-12-30 | Stop reason: DRUGHIGH

## 2019-11-08 NOTE — PROGRESS NOTES
[facility] [place]  Rowena Pierre / 63 y.o. / male  11/08/2019      ASSESSMENT & PLAN:    Problem List Items Addressed This Visit        Cardiovascular and Mediastinum    Hypertension - Primary    Relevant Medications    amLODIPine (NORVASC) 10 MG tablet    Hyperlipidemia       Digestive    Fatty liver    Relevant Orders    Comprehensive Metabolic Panel       Other    Abnormal transaminases    Overview     10/8/18         Relevant Orders    Comprehensive Metabolic Panel      Other Visit Diagnoses     Fatigue, unspecified type        Relevant Orders    CBC & Differential    Comprehensive Metabolic Panel    TSH Rfx On Abnormal To Free T4    Cough        Relevant Orders    XR Chest PA & Lateral    Axillary mass, right        Relevant Orders    US Axilla Right        Orders Placed This Encounter   Procedures   • XR Chest PA & Lateral   • US Axilla Right   • Comprehensive Metabolic Panel   • TSH Rfx On Abnormal To Free T4   • CBC & Differential     New Medications Ordered This Visit   Medications   • amLODIPine (NORVASC) 10 MG tablet     Sig: Take 1 tablet by mouth Daily.     Dispense:  90 tablet     Refill:  1       Summary/Discussion:    1. Essential hypertension  Blood pressure uncontrolled.  Patient agreeable to increasing amlodipine dose to 10 mg daily.  Discussed possible side effects of increased lower extremity edema and to notify me if the symptoms occur.  We will have him follow-up in office for 2 weeks for blood pressure recheck.    - amLODIPine (NORVASC) 10 MG tablet; Take 1 tablet by mouth Daily.  Dispense: 90 tablet; Refill: 1    2. Hyperlipidemia, unspecified hyperlipidemia type      3. Fatty liver    - Comprehensive Metabolic Panel    4. Abnormal transaminases  History of abnormal transaminases as well as elevated bilirubin and alk phos.  Liver ultrasound done March of this year demonstrated likely fatty liver.  He has an upcoming gastroenterology evaluation this month.   "Recheck levels today related to recent increase in values.     - Comprehensive Metabolic Panel    5. Fatigue, unspecified type    - CBC & Differential  - Comprehensive Metabolic Panel  - TSH Rfx On Abnormal To Free T4    6. Cough  CXR today r/t cough and axillary mass of unknown origin. Cough likely r/t recent ACEI use.     - XR Chest PA & Lateral    7. Axillary mass, right  Recheck CBC today.  Ultrasound of right axillary mass.  Further evaluation and treatment pending results of imaging and labs.  Suspect enlarged lymph node versus subcutaneous fatty tumor.    - US Axilla Right        Return in about 2 weeks (around 11/22/2019) for Recheck.    ____________________________________________________________________    MEDICATIONS  Current Outpatient Medications   Medication Sig Dispense Refill   • amLODIPine (NORVASC) 10 MG tablet Take 1 tablet by mouth Daily. 90 tablet 1     No current facility-administered medications for this visit.           VITALS:    Visit Vitals  /80   Pulse 74   Temp 98.4 °F (36.9 °C)   Ht 190.5 cm (75\")   Wt 104 kg (229 lb 12.8 oz)   SpO2 99%   BMI 28.72 kg/m²       BP Readings from Last 3 Encounters:   11/08/19 156/80   09/30/19 134/78   08/26/19 138/88     Wt Readings from Last 3 Encounters:   11/08/19 104 kg (229 lb 12.8 oz)   09/30/19 103 kg (227 lb)   08/26/19 105 kg (231 lb 12.8 oz)      Body mass index is 28.72 kg/m².    CC:  Main reason(s) for today's visit: Cough; Medication Reaction; Hypertension; and Cyst (R axillary )      HPI:     Hypertension: Patient here for follow-up of elevated blood pressure. Blood pressure is not well controlled at home, usually 130s/80s. Patient was seen by ZOE Valiente while I was out of the office.  At that time, blood pressure medication was augmented to add benazepril 20 mg to current amlodipine 5 mg.  Patient reports he stopped taking this combination medication approximately 2 weeks ago related to significant cough which seem to start " "around the same time as the medication was initiated.  He has just been taking amlodipine 5 mg at nighttime since then.  Cough is somewhat better, although still there, nonproductive.  Occasional shortness of breath.    Skin Lesion: Patient complains of a skin lesion of the right axilla. The lesion has been present for 1 month. Lesion has not changed in 1 month. Symptoms associated with the lesion are: painful to touch, fatigue, reports \"swelling of right side\" under rib area. Patient denies drainage, bleeding, fever.      Patient Care Team:  Rowena Motley APRN as PCP - General (Internal Medicine)    ____________________________________________________________________    REVIEW OF SYSTEMS    Review of Systems   Constitutional: Positive for fatigue. Negative for activity change, appetite change, chills, fever and unexpected weight change.   HENT: Negative for tinnitus.    Eyes: Negative for visual disturbance.   Respiratory: Negative for cough, chest tightness and shortness of breath.    Cardiovascular: Negative for chest pain, palpitations and leg swelling.   Gastrointestinal: Negative for abdominal pain, constipation, diarrhea, nausea and vomiting.   Neurological: Negative for dizziness, light-headedness and headaches.         PHYSICAL EXAMINATION    Physical Exam   Constitutional: He is oriented to person, place, and time. Vital signs are normal. He appears well-developed and well-nourished. He is cooperative. He does not appear ill. No distress.   Cardiovascular: Normal rate, regular rhythm, S1 normal, S2 normal and normal heart sounds.   No murmur heard.  Pulmonary/Chest: Effort normal and breath sounds normal. He has no decreased breath sounds. He has no wheezes. He has no rhonchi. He has no rales.       Lymphadenopathy:        Head (right side): No submental, no submandibular, no tonsillar, no preauricular, no posterior auricular and no occipital adenopathy present.        Head (left side): No submental, " no submandibular, no tonsillar, no preauricular, no posterior auricular and no occipital adenopathy present.     He has no cervical adenopathy.        Right: No supraclavicular and no epitrochlear adenopathy present.        Left: No supraclavicular and no epitrochlear adenopathy present.   Neurological: He is alert and oriented to person, place, and time.   Skin: Skin is warm, dry and intact.   Psychiatric: He has a normal mood and affect. His speech is normal and behavior is normal. Judgment and thought content normal. Cognition and memory are normal.   Nursing note and vitals reviewed.      REVIEWED DATA:    Labs:     Lab Results   Component Value Date     09/30/2019    K 4.4 09/30/2019    AST 48 (H) 09/30/2019    ALT 32 09/30/2019    BUN 15 09/30/2019    CREATININE 0.65 (L) 09/30/2019    CREATININE 0.77 08/26/2019    CREATININE 0.91 04/29/2019    EGFRIFNONA 123 09/30/2019    EGFRIFAFRI 149 09/30/2019       Lab Results   Component Value Date    HGBA1C 5.8 (H) 02/14/2019    HGBA1C 6.00 (H) 07/24/2017    HGBA1C 5.5 09/04/2015       Lab Results   Component Value Date     (H) 08/26/2019     (H) 04/29/2019     (H) 11/28/2017    HDL 27 (L) 08/26/2019    HDL 38 (L) 04/29/2019    HDL 47 11/28/2017    TRIG 150 08/26/2019    TRIG 131 04/29/2019    TRIG 105 11/28/2017    CHOLHDLRATIO 6.26 08/26/2019    CHOLHDLRATIO 4.71 04/29/2019       Lab Results   Component Value Date    TSH 1.240 08/30/2017    FREET4 1.09 08/30/2017       Lab Results   Component Value Date    WBC 5.92 09/30/2019    HGB 13.7 09/30/2019    HGB 14.6 07/25/2018    HGB 14.0 08/30/2017     09/30/2019       Lab Results   Component Value Date    PROTEIN Negative 11/28/2017    GLUCOSEU Negative 11/28/2017    BLOODU Negative 11/28/2017    NITRITEU Negative 11/28/2017    LEUKOCYTESUR Negative 11/28/2017       Imaging:         Medical Tests:         Summary of old records / correspondence / consultant report:         Request outside  records:         ALLERGIES  No Known Allergies     PFSH:     The following portions of the patient's history were reviewed and updated as appropriate: Allergies / Current Medications / Past Medical History / Surgical History / Social History / Family History    PROBLEM LIST   Patient Active Problem List   Diagnosis   • Impaired fasting glucose   • Hypertension   • Hyperlipidemia   • Impotence of organic origin   • Osteoarthritis of knee   • Tear of medial meniscus of knee   • Obesity (BMI 30-39.9)   • Routine health maintenance   • Nocturia   • Abnormal laboratory test   • Other sleep apnea   • Lower GI bleed   • Skin cancer   • Abnormal transaminases   • Rectal bleed   • Fatty liver       PAST MEDICAL HISTORY  Past Medical History:   Diagnosis Date   • ED (erectile dysfunction)    • Hyperlipidemia    • Hypertension    • Skin cancer 2017    Basal cell carcinoma       SURGICAL HISTORY  Past Surgical History:   Procedure Laterality Date   • COLONOSCOPY     • COLONOSCOPY N/A 3/15/2019    Procedure: COLONOSCOPY to cecum:  cold snare polypectomies;  Surgeon: Sunil Farr MD;  Location: [facility] ENDOSCOPY;  Service: Gastroenterology   • SKIN CANCER EXCISION      basal cell       SOCIAL HISTORY  Social History     Socioeconomic History   • Marital status: Single     Spouse name: Not on file   • Number of children: 3   • Years of education: Not on file   • Highest education level: Not on file   Occupational History   • Occupation: 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   Tobacco Use   • Smoking status: Former Smoker     Packs/day: 0.50     Years: 23.00     Pack years: 11.50     Types: Cigars     Start date:      Last attempt to quit:      Years since quittin.8   • Smokeless tobacco: Never Used   Substance and Sexual Activity   • Alcohol use: No   • Drug use: No   • Sexual activity: No   Social History Narrative    Single       FAMILY HISTORY  Family History   Problem Relation Age of Onset   • Hypertension  Father    • Hypertension Brother    • Cancer Brother         Liver   • Cancer Brother         Bladder         **[place]ose Disclaimer:   Much of this encounter note is an electronic transcription/translation of spoken language to printed text. The electronic translation of spoken language may permit erroneous, or at times, nonsensical words or phrases to be inadvertently transcribed. Although I have reviewed the note for such errors, some may still exist.

## 2019-11-21 ENCOUNTER — OFFICE VISIT (OUTPATIENT)
Dept: GASTROENTEROLOGY | Facility: CLINIC | Age: 64
End: 2019-11-21

## 2019-11-21 ENCOUNTER — HOSPITAL ENCOUNTER (OUTPATIENT)
Dept: ULTRASOUND IMAGING | Facility: HOSPITAL | Age: 64
Discharge: HOME | End: 2019-11-21
Admitting: NURSE PRACTITIONER

## 2019-11-21 VITALS
SYSTOLIC BLOOD PRESSURE: 154 MMHG | WEIGHT: 230 LBS | DIASTOLIC BLOOD PRESSURE: 78 MMHG | HEIGHT: 72 IN | BODY MASS INDEX: 31.15 KG/M2 | HEART RATE: 80 BPM

## 2019-11-21 DIAGNOSIS — R74.8 ABNORMAL TRANSAMINASES: Primary | ICD-10-CM

## 2019-11-21 PROCEDURE — 76882 US LMTD JT/FCL EVL NVASC XTR: CPT

## 2019-11-21 PROCEDURE — 99214 OFFICE O/P EST MOD 30 MIN: CPT | Performed by: INTERNAL MEDICINE

## 2019-11-21 NOTE — PROGRESS NOTES
Subjective   Miles Pierre is a 63 y.o.. male is here today for follow-up.    Chief Complaint   Patient presents with   • Elevated Hepatic Enzymes     History of Present Illness  Patient was referred for abnormal liver chemistries.  On review he has had abnormal liver chemistries dating back to throughout 2019.  The transaminase elevations are relatively mild but the AST is been persistently above the ALT.  Patient does not consume alcohol.  He has a family history of liver cancer but says that the brother who had it did not have a healthy lifestyle.  Patient was tested for hepatitis C 3 years ago for routine screening and was negative.  He currently works in sales.  He has occasionally some dull ache in his upper abdomen when he takes a deep breath.  He also feels unusually tired these days.  He had an ultrasound performed early in the year and that demonstrated possible fatty liver.    The following portions of the patient's history were reviewed and updated as appropriate: allergies, current medications, past family history, past medical history, past social history, past surgical history and problem list.      Current Outpatient Medications:   •  amLODIPine (NORVASC) 10 MG tablet, Take 1 tablet by mouth Daily., Disp: 90 tablet, Rfl: 1    Family History   Problem Relation Age of Onset   • Hypertension Father    • Hypertension Brother    • Cancer Brother         Liver   • Liver disease Brother    • Cancer Brother         Bladder   • Colon cancer Neg Hx    • Colon polyps Neg Hx        Review of Systems   Respiratory: Negative for shortness of breath.    Cardiovascular: Negative for chest pain.       Objective   Physical Exam   Constitutional: He is oriented to person, place, and time. He appears well-developed and well-nourished.   HENT:   Head: Normocephalic and atraumatic.   Right Ear: External ear normal.   Left Ear: External ear normal.   Eyes: Conjunctivae and EOM are normal. Pupils are equal, round, and  reactive to light.   Abdominal: Soft. Bowel sounds are normal. He exhibits no distension. There is no hepatosplenomegaly. There is no tenderness. There is no guarding.   Neurological: He is alert and oriented to person, place, and time.   Psychiatric: He has a normal mood and affect. His behavior is normal. Judgment and thought content normal.   Nursing note and vitals reviewed.      Pertinent laboratory results were reviewed. , Pertinent old records were reviewed.  and Pertinent radiology results were reviewed.     Assessment/Plan   Problems Addressed this Visit        Other    Abnormal transaminases - Primary    Relevant Orders    CT Abdomen With Contrast    Iron Profile        The persistent low level transaminase elevations with the AST being greater than the ALT is bothersome.  I would recommend CT scan of the upper abdomen, and iron profile, and a hepatitis B surface antigen.  I like to see him back in a couple weeks.

## 2019-11-22 ENCOUNTER — TELEPHONE (OUTPATIENT)
Dept: INTERNAL MEDICINE | Age: 64
End: 2019-11-22

## 2019-11-22 DIAGNOSIS — R59.0 LOCALIZED ENLARGED LYMPH NODES: ICD-10-CM

## 2019-11-22 DIAGNOSIS — R22.31 MASS OF RIGHT AXILLA: Primary | ICD-10-CM

## 2019-11-22 NOTE — TELEPHONE ENCOUNTER
Orders for mammo and US guided lymph node biopsy have been ordered, and sent to your inbox for signature.

## 2019-11-22 NOTE — TELEPHONE ENCOUNTER
Notified patient of abnormal ultrasound renal results via telephone and radiologist recommendations.  Order will be placed for ultrasound-guided biopsy as well as mammogram.

## 2019-11-25 ENCOUNTER — OFFICE VISIT (OUTPATIENT)
Dept: INTERNAL MEDICINE | Age: 64
End: 2019-11-25

## 2019-11-25 VITALS
HEART RATE: 83 BPM | DIASTOLIC BLOOD PRESSURE: 68 MMHG | HEIGHT: 72 IN | OXYGEN SATURATION: 98 % | TEMPERATURE: 98.4 F | SYSTOLIC BLOOD PRESSURE: 138 MMHG | BODY MASS INDEX: 31.56 KG/M2 | WEIGHT: 233 LBS

## 2019-11-25 DIAGNOSIS — E78.5 HYPERLIPIDEMIA, UNSPECIFIED HYPERLIPIDEMIA TYPE: ICD-10-CM

## 2019-11-25 DIAGNOSIS — I10 ESSENTIAL HYPERTENSION: Primary | ICD-10-CM

## 2019-11-25 DIAGNOSIS — Z23 ENCOUNTER FOR IMMUNIZATION: ICD-10-CM

## 2019-11-25 PROCEDURE — 90653 IIV ADJUVANT VACCINE IM: CPT | Performed by: NURSE PRACTITIONER

## 2019-11-25 PROCEDURE — G0008 ADMIN INFLUENZA VIRUS VAC: HCPCS | Performed by: NURSE PRACTITIONER

## 2019-11-25 PROCEDURE — 99214 OFFICE O/P EST MOD 30 MIN: CPT | Performed by: NURSE PRACTITIONER

## 2019-11-25 NOTE — PROGRESS NOTES
[facility] INTERNAL MEDICINE  Rowena Pierre / 63 y.o. / male  11/25/2019      ASSESSMENT & PLAN:    Problem List Items Addressed This Visit        Cardiovascular and Mediastinum    Hypertension - Primary    Relevant Medications    amLODIPine (NORVASC) 10 MG tablet    Hyperlipidemia      Other Visit Diagnoses     Encounter for immunization        Relevant Orders    Fluzone High Dose =>65Years ([employer] ONLY) ([id number]) (Completed)        Orders Placed This Encounter   Procedures   • Fluzone High Dose =>65Years ([employer] ONLY) ([id number])     No orders of the defined types were placed in this encounter.      Summary/Discussion:    1. Essential hypertension  Systolic blood pressure borderline elevated.  However, patient is having some acute health concerns which has made him slightly anxious.  I would be hesitant to increase his medication any further at this time due to risk of decreasing his diastolic pressure too much.  Instructed patient to continue to monitor blood pressures at home and notify me if consistently greater than 130-140/80. Will follow up in 4 months for BP check.     Continue lifestyle modifications for high blood pressure, high cholesterol, and increased weight. Decrease/eliminate soda, caffeine, alcohol and overall caloric intake. Reduce carbohydrates and sweets in diet.  Continue to improve dietary habits with lean proteins, fresh vegetables, fruits, and nuts. Improve aerobic exercise: walking/biking/swimming daily as tolerated, recommend 30 minutes/day at least 5 days/week.     2. Hyperlipidemia, unspecified hyperlipidemia type      3. Encounter for immunization    - Fluzone High Dose =>65Years ([employer] ONLY) ([id number])        Return in about 4 months (around 3/25/2020) for Next scheduled follow up.    ____________________________________________________________________    MEDICATIONS  Current Outpatient Medications   Medication Sig Dispense Refill   • amLODIPine (NORVASC)  "10 MG tablet Take 1 tablet by mouth Daily. 90 tablet 1     No current facility-administered medications for this visit.           VITALS:    Visit Vitals  /68   Pulse 83   Temp 98.4 °F (36.9 °C)   Ht 182.9 cm (72.01\")   Wt 106 kg (233 lb)   SpO2 98%   BMI 31.59 kg/m²       BP Readings from Last 3 Encounters:   11/25/19 138/68   11/21/19 154/78   11/08/19 156/80     Wt Readings from Last 3 Encounters:   11/25/19 106 kg (233 lb)   11/21/19 104 kg (230 lb)   11/08/19 104 kg (229 lb 12.8 oz)      Body mass index is 31.59 kg/m².    CC:  Main reason(s) for today's visit: Hypertension (2 wk f/u )      HPI:     Hypertension: Patient here for follow-up of elevated blood pressure. He is exercising and is adherent to low salt diet.  Blood pressure is well controlled at home, reports 130s/60-70s. Cardiac symptoms none. Patient denies chest pain, chest pressure/discomfort and cough.  Cardiovascular risk factors: advanced age (older than 55 for men, 65 for women), dyslipidemia, hypertension and obesity (BMI >= 30 kg/m2). Use of agents associated with hypertension: none. History of target organ damage: none.  His amlodipine was recently increased from 5 to 10 mg daily.  He has had some mild edema from this, but nothing significant or troubling.     Patient is currently in the process of further evaluation by gastroenterology related to recent elevated LFTs.  Has CT scan scheduled and further lab testing.    Also, had recent ultrasound of right axillary mass which was concerning for possible pathology and lymph node.  He will be scheduling ultrasound-guided biopsy as well as mammogram per the radiologist recommendations in the near future.  He reports the area has not changed much, if anything, is slightly smaller than last check. Overall, feels okay other than having some anxiety about his current health concerns.     Patient Care Team:  Rowena Motley APRN as PCP - General (Internal " Medicine)    ____________________________________________________________________    REVIEW OF SYSTEMS    Review of Systems   Constitutional: Negative for activity change, appetite change and unexpected weight change.   HENT: Negative for tinnitus.    Eyes: Negative for visual disturbance.   Respiratory: Positive for cough and shortness of breath. Negative for chest tightness.    Cardiovascular: Negative for chest pain, palpitations and leg swelling.   Neurological: Negative for dizziness, light-headedness and headaches.         PHYSICAL EXAMINATION    Physical Exam   Constitutional: He is oriented to person, place, and time. Vital signs are normal. He appears well-developed and well-nourished. He is cooperative. He does not appear ill. No distress.   Cardiovascular: Normal rate, regular rhythm, S1 normal, S2 normal and normal heart sounds.   No murmur heard.  Trace bilateral lower extremity edema    Pulmonary/Chest: Effort normal and breath sounds normal. He has no decreased breath sounds. He has no wheezes. He has no rhonchi. He has no rales.   Neurological: He is alert and oriented to person, place, and time.   Skin: Skin is warm, dry and intact.        Psychiatric: He has a normal mood and affect. His speech is normal and behavior is normal. Judgment and thought content normal. Cognition and memory are normal.   Nursing note and vitals reviewed.      REVIEWED DATA:    Labs:     Lab Results   Component Value Date     11/08/2019    K 4.5 11/08/2019    AST 53 (H) 11/08/2019    ALT 34 11/08/2019    BUN 15 11/08/2019    CREATININE 0.65 (L) 11/08/2019    CREATININE 0.65 (L) 09/30/2019    CREATININE 0.77 08/26/2019    EGFRIFNONA 123 11/08/2019    EGFRIFAFRI 149 11/08/2019       Lab Results   Component Value Date    HGBA1C 5.8 (H) 02/14/2019    HGBA1C 6.00 (H) 07/24/2017    HGBA1C 5.5 09/04/2015       Lab Results   Component Value Date     (H) 08/26/2019     (H) 04/29/2019     (H) 11/28/2017     HDL 27 (L) 08/26/2019    HDL 38 (L) 04/29/2019    HDL 47 11/28/2017    TRIG 150 08/26/2019    TRIG 131 04/29/2019    TRIG 105 11/28/2017    CHOLHDLRATIO 6.26 08/26/2019    CHOLHDLRATIO 4.71 04/29/2019       Lab Results   Component Value Date    TSH 2.560 11/08/2019    FREET4 1.09 08/30/2017       Lab Results   Component Value Date    WBC 6.81 11/08/2019    HGB 13.1 11/08/2019    HGB 13.7 09/30/2019    HGB 14.6 07/25/2018     11/08/2019       Lab Results   Component Value Date    PROTEIN Negative 11/28/2017    GLUCOSEU Negative 11/28/2017    BLOODU Negative 11/28/2017    NITRITEU Negative 11/28/2017    LEUKOCYTESUR Negative 11/28/2017       Imaging:         Medical Tests:         Summary of old records / correspondence / consultant report:         Request outside records:         ALLERGIES  No Known Allergies     PFSH:     The following portions of the patient's history were reviewed and updated as appropriate: Allergies / Current Medications / Past Medical History / Surgical History / Social History / Family History    PROBLEM LIST   Patient Active Problem List   Diagnosis   • Impaired fasting glucose   • Hypertension   • Hyperlipidemia   • Impotence of organic origin   • Osteoarthritis of knee   • Tear of medial meniscus of knee   • Obesity (BMI 30-39.9)   • Routine health maintenance   • Nocturia   • Abnormal laboratory test   • Other sleep apnea   • Lower GI bleed   • Skin cancer   • Abnormal transaminases   • Rectal bleed   • Fatty liver       PAST MEDICAL HISTORY  Past Medical History:   Diagnosis Date   • ED (erectile dysfunction)    • Hyperlipidemia    • Hypertension    • Skin cancer 05/2017    Basal cell carcinoma       SURGICAL HISTORY  Past Surgical History:   Procedure Laterality Date   • COLONOSCOPY     • COLONOSCOPY N/A 3/15/2019    Procedure: COLONOSCOPY to cecum:  cold snare polypectomies;  Surgeon: Sunil Farr MD;  Location: [facility] ENDOSCOPY;  Service: Gastroenterology   • SKIN CANCER  EXCISION      basal cell       SOCIAL HISTORY  Social History     Socioeconomic History   • Marital status: Single     Spouse name: Not on file   • Number of children: 3   • Years of education: Not on file   • Highest education level: Not on file   Occupational History   • Occupation: 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   Tobacco Use   • Smoking status: Former Smoker     Packs/day: 0.50     Years: 23.00     Pack years: 11.50     Types: Cigars     Start date:      Last attempt to quit:      Years since quittin.9   • Smokeless tobacco: Never Used   Substance and Sexual Activity   • Alcohol use: No   • Drug use: No   • Sexual activity: No   Social History Narrative    Single       FAMILY HISTORY  Family History   Problem Relation Age of Onset   • Hypertension Father    • Hypertension Brother    • Cancer Brother         Liver   • Liver disease Brother    • Cancer Brother         Bladder   • Colon cancer Neg Hx    • Colon polyps Neg Hx          **Dragon Disclaimer:   Much of this encounter note is an electronic transcription/translation of spoken language to printed text. The electronic translation of spoken language may permit erroneous, or at times, nonsensical words or phrases to be inadvertently transcribed. Although I have reviewed the note for such errors, some may still exist.

## 2019-11-26 ENCOUNTER — RESULTS ENCOUNTER (OUTPATIENT)
Dept: GASTROENTEROLOGY | Facility: CLINIC | Age: 64
End: 2019-11-26

## 2019-11-26 DIAGNOSIS — R74.8 ABNORMAL TRANSAMINASES: ICD-10-CM

## 2019-11-27 ENCOUNTER — TELEPHONE (OUTPATIENT)
Dept: GASTROENTEROLOGY | Facility: CLINIC | Age: 64
End: 2019-11-27

## 2019-11-27 NOTE — TELEPHONE ENCOUNTER
Pt was seen in office 11/21/19.  Dr Farr ordered labs (iron profile, Hep BsAG) and CT after pt had already left pt.  Jessy called pt after visit to notify him and he was to call back to discuss these but he has not.    It looks like hospital scheduling department has scheduled CT for 12/13/19.  Still need to notify pt of the labs Dr Farr has ordered.  Pt has f/u sched 12/2 but need to r/s after CT done.    Attempted to call pt to discuss above, but no answer and voicemail full.    Pt called back.  Discussed all of above.  R/S 12/2 appt to 12/16/19 so that we have all test results back.  Mailed lab order to pt.

## 2019-12-06 ENCOUNTER — TELEPHONE (OUTPATIENT)
Dept: INTERNAL MEDICINE | Age: 64
End: 2019-12-06

## 2019-12-06 DIAGNOSIS — R22.31 AXILLARY MASS, RIGHT: Primary | ICD-10-CM

## 2019-12-06 DIAGNOSIS — R59.0 LOCALIZED ENLARGED LYMPH NODES: ICD-10-CM

## 2019-12-06 NOTE — TELEPHONE ENCOUNTER
Emily w/Mammogram Dept is requesting an order to be changed to: bilateral diagnostic mammogram & u/s prn.    Pt is scheduled for Monday, 12/9/19.    Pls advise.    Emily Perla can be reached #[phone].

## 2019-12-09 ENCOUNTER — HOSPITAL ENCOUNTER (OUTPATIENT)
Dept: ULTRASOUND IMAGING | Facility: HOSPITAL | Age: 64
Discharge: HOME | End: 2019-12-09
Admitting: RADIOLOGY

## 2019-12-09 ENCOUNTER — HOSPITAL ENCOUNTER (OUTPATIENT)
Dept: MAMMOGRAPHY | Facility: HOSPITAL | Age: 64
Discharge: HOME | End: 2019-12-09

## 2019-12-09 VITALS
SYSTOLIC BLOOD PRESSURE: 168 MMHG | HEIGHT: 74 IN | BODY MASS INDEX: 29.52 KG/M2 | DIASTOLIC BLOOD PRESSURE: 98 MMHG | WEIGHT: 230 LBS | HEART RATE: 82 BPM | RESPIRATION RATE: 18 BRPM | OXYGEN SATURATION: 97 % | TEMPERATURE: 97 F

## 2019-12-09 DIAGNOSIS — R22.31 MASS OF RIGHT AXILLA: ICD-10-CM

## 2019-12-09 DIAGNOSIS — R22.31 AXILLARY MASS, RIGHT: ICD-10-CM

## 2019-12-09 DIAGNOSIS — R59.0 LOCALIZED ENLARGED LYMPH NODES: ICD-10-CM

## 2019-12-09 PROCEDURE — 88300 SURGICAL PATH GROSS: CPT | Performed by: NURSE PRACTITIONER

## 2019-12-09 PROCEDURE — 77066 DX MAMMO INCL CAD BI: CPT

## 2019-12-09 PROCEDURE — 88342 IMHCHEM/IMCYTCHM 1ST ANTB: CPT | Performed by: NURSE PRACTITIONER

## 2019-12-09 PROCEDURE — 88341 IMHCHEM/IMCYTCHM EA ADD ANTB: CPT | Performed by: NURSE PRACTITIONER

## 2019-12-09 PROCEDURE — 76942 ECHO GUIDE FOR BIOPSY: CPT

## 2019-12-09 PROCEDURE — 25010000003 LIDOCAINE 1 % SOLUTION: Performed by: RADIOLOGY

## 2019-12-09 PROCEDURE — 88184 FLOWCYTOMETRY/ TC 1 MARKER: CPT

## 2019-12-09 PROCEDURE — 88185 FLOWCYTOMETRY/TC ADD-ON: CPT

## 2019-12-09 PROCEDURE — 88307 TISSUE EXAM BY PATHOLOGIST: CPT | Performed by: NURSE PRACTITIONER

## 2019-12-09 RX ORDER — LIDOCAINE HYDROCHLORIDE 10 MG/ML
20 INJECTION, SOLUTION INFILTRATION; PERINEURAL ONCE
Status: COMPLETED | OUTPATIENT
Start: 2019-12-09 | End: 2019-12-09

## 2019-12-09 RX ADMIN — LIDOCAINE HYDROCHLORIDE 10 ML: 10 INJECTION, SOLUTION INFILTRATION; PERINEURAL at 13:48

## 2019-12-09 NOTE — DISCHARGE INSTRUCTIONS
EDUCATION /DISCHARGE INSTRUCTIONS  CT/US guided biopsy:  A biopsy is a procedure done to remove tissue for further analysis.  Before images are taken to locate the target area.  Images can be obtained using ultrasound, CT or MRI.  A physician will clean your skin with antiseptic soap, place a sterile towel around the site and administer a local anesthetic to numb the area.  The physician will then insert a special needle.  Sometimes images are taken of the needle after it is inserted to ensure the needle is in the correct area to be biopsied.   A sample is obtained and sent to the laboratory for study.  Occasionally the laboratory is unable to make a diagnosis from the sample and the procedure may need to be repeated.  Within a week the radiologist will send a report to your physician.  A pathologist will also examine the tissue and send a report.    Risks of the procedure include but are not limited to:   *  Bleeding    *  Infection   *  Puncture of surrounding organs *  Death     *  Lung collapse if the biopsy is near the chest which may require insertion of a      chest tube to re-inflate the lung if severe.    Benefits of the procedure:  Using x-ray helps to locate the area that requires a biopsy. The procedure is less invasive than a surgical procedure, there are no large incisions and it does not require anesthesia.    Alternatives to the procedure:  A biopsy can be performed surgically.  Risks of a surgical biopsy include exposure to anesthesia, infection, excessive bleeding and injury to abdominal organs.  A benefit of surgical biopsy is the ability to see the area to be biopsied and remove of a larger piece of tissue.    THIS EDUCATION INFORMATION WAS REVIEWED PRIOR TO PROCEDURE AND CONSENT. Patient initials__________________Time___1144________________    Post Procedure:    *  Expect the biopsy site may be tender up to one week.    *  Rest today (no pushing pulling or straining).   *  Slowly increase  activity tomorrow.    *  If you received sedation do not drive for 24 hours.   *  Keep dressing clean and dry.   *  Leave dressing on puncture site for 24 hours.    *  You may shower when dressing removed.  Call your doctor if experiencing:   *  Signs of infection such as redness, swelling, excessive pain and / or foul        smelling drainage from the puncture site.   *  Chills or fever over 101 degrees (by mouth).   *  Unrelieved pain.   *  Any new or severe symptoms.   *  If experiencing sudden / severe shortness of breath or chest pain go to the       nearest emergency room.   Following the procedure:     Follow-up with the ordering physician as directed.    Continue to take other medications as directed by your physician unless    otherwise instructed.    If you have any concerns please call the Radiology Nurses Desk at [phone].  You are the most important factor in your recovery.  Follow the above instructions carefully.

## 2019-12-10 ENCOUNTER — TELEPHONE (OUTPATIENT)
Dept: INTERVENTIONAL RADIOLOGY/VASCULAR | Facility: HOSPITAL | Age: 64
End: 2019-12-10

## 2019-12-12 ENCOUNTER — TELEPHONE (OUTPATIENT)
Dept: INTERNAL MEDICINE | Age: 64
End: 2019-12-12

## 2019-12-12 DIAGNOSIS — R89.7 ABNORMAL BIOPSY RESULT: Primary | ICD-10-CM

## 2019-12-12 DIAGNOSIS — C43.9 METASTATIC MELANOMA (HCC): ICD-10-CM

## 2019-12-12 NOTE — TELEPHONE ENCOUNTER
Discussed results of abnormal biopsy via telephone with patient, positive for metastatic melanoma of unknown primary site.    He will need follow-up with his dermatologist, I will place referral back to dermatology as well as a message to his dermatologist.  We will also refer to oncology for further evaluation and treatment.

## 2019-12-13 ENCOUNTER — APPOINTMENT (OUTPATIENT)
Dept: CT IMAGING | Facility: HOSPITAL | Age: 64
End: 2019-12-13

## 2019-12-16 ENCOUNTER — OFFICE VISIT (OUTPATIENT)
Dept: GASTROENTEROLOGY | Facility: CLINIC | Age: 64
End: 2019-12-16

## 2019-12-16 ENCOUNTER — APPOINTMENT (OUTPATIENT)
Dept: LAB | Facility: HOSPITAL | Age: 64
End: 2019-12-16

## 2019-12-16 ENCOUNTER — CONSULT (OUTPATIENT)
Dept: ONCOLOGY | Facility: CLINIC | Age: 64
End: 2019-12-16

## 2019-12-16 VITALS
HEIGHT: 75 IN | WEIGHT: 227.1 LBS | HEART RATE: 80 BPM | SYSTOLIC BLOOD PRESSURE: 158 MMHG | TEMPERATURE: 97.5 F | BODY MASS INDEX: 28.24 KG/M2 | DIASTOLIC BLOOD PRESSURE: 81 MMHG | RESPIRATION RATE: 18 BRPM | OXYGEN SATURATION: 97 %

## 2019-12-16 DIAGNOSIS — C77.9 MALIGNANT MELANOMA METASTATIC TO LYMPH NODE (HCC): ICD-10-CM

## 2019-12-16 DIAGNOSIS — C43.9 METASTATIC MELANOMA (HCC): Primary | ICD-10-CM

## 2019-12-16 DIAGNOSIS — Z11.59 NEED FOR HEPATITIS B SCREENING TEST: ICD-10-CM

## 2019-12-16 DIAGNOSIS — D64.9 NORMOCHROMIC NORMOCYTIC ANEMIA: ICD-10-CM

## 2019-12-16 DIAGNOSIS — C80.1: ICD-10-CM

## 2019-12-16 DIAGNOSIS — R74.8 ABNORMAL TRANSAMINASES: Primary | ICD-10-CM

## 2019-12-16 DIAGNOSIS — C43.9 MALIGNANT MELANOMA METASTATIC TO LYMPH NODE (HCC): ICD-10-CM

## 2019-12-16 DIAGNOSIS — R74.8 ABNORMAL TRANSAMINASES: ICD-10-CM

## 2019-12-16 DIAGNOSIS — C43.9 MALIGNANT MELANOMA, UNSPECIFIED SITE (HCC): Primary | ICD-10-CM

## 2019-12-16 DIAGNOSIS — C79.89: ICD-10-CM

## 2019-12-16 DIAGNOSIS — C43.9 MALIGNANT MELANOMA, UNSPECIFIED SITE (HCC): ICD-10-CM

## 2019-12-16 DIAGNOSIS — K76.0 FATTY LIVER: ICD-10-CM

## 2019-12-16 LAB
ALBUMIN SERPL-MCNC: 4 G/DL (ref 3.5–5.2)
ALBUMIN/GLOB SERPL: 1 G/DL (ref 1.1–2.4)
ALP SERPL-CCNC: 312 U/L (ref 38–116)
ALT SERPL W P-5'-P-CCNC: 26 U/L (ref 0–41)
ANION GAP SERPL CALCULATED.3IONS-SCNC: 13.3 MMOL/L (ref 5–15)
AST SERPL-CCNC: 52 U/L (ref 0–40)
BASOPHILS # BLD AUTO: 0.02 10*3/MM3 (ref 0–0.2)
BASOPHILS NFR BLD AUTO: 0.3 % (ref 0–1.5)
BILIRUB SERPL-MCNC: 1.8 MG/DL (ref 0.2–1.2)
BUN BLD-MCNC: 13 MG/DL (ref 6–20)
BUN/CREAT SERPL: 19.7 (ref 7.3–30)
CALCIUM SPEC-SCNC: 9.1 MG/DL (ref 8.5–10.2)
CHLORIDE SERPL-SCNC: 103 MMOL/L (ref 98–107)
CO2 SERPL-SCNC: 25.7 MMOL/L (ref 22–29)
CREAT BLD-MCNC: 0.66 MG/DL (ref 0.7–1.3)
DEPRECATED RDW RBC AUTO: 42.9 FL (ref 37–54)
EOSINOPHIL # BLD AUTO: 0.1 10*3/MM3 (ref 0–0.4)
EOSINOPHIL NFR BLD AUTO: 1.7 % (ref 0.3–6.2)
ERYTHROCYTE [DISTWIDTH] IN BLOOD BY AUTOMATED COUNT: 13.2 % (ref 12.3–15.4)
FERRITIN SERPL-MCNC: 259.5 NG/ML (ref 30–400)
FOLATE SERPL-MCNC: 6.44 NG/ML (ref 4.78–24.2)
GFR SERPL CREATININE-BSD FRML MDRD: 121 ML/MIN/1.73
GLOBULIN UR ELPH-MCNC: 3.9 GM/DL (ref 1.8–3.5)
GLUCOSE BLD-MCNC: 114 MG/DL (ref 74–124)
HBV SURFACE AB SER RIA-ACNC: NORMAL
HBV SURFACE AG SERPL QL IA: NORMAL
HCT VFR BLD AUTO: 35.5 % (ref 37.5–51)
HCV AB SER DONR QL: NORMAL
HGB BLD-MCNC: 12.2 G/DL (ref 13–17.7)
IMM GRANULOCYTES # BLD AUTO: 0.02 10*3/MM3 (ref 0–0.05)
IMM GRANULOCYTES NFR BLD AUTO: 0.3 % (ref 0–0.5)
IRON 24H UR-MRATE: 86 MCG/DL (ref 59–158)
IRON SATN MFR SERPL: 26 % (ref 14–48)
LDH SERPL-CCNC: 311 U/L (ref 99–259)
LYMPHOCYTES # BLD AUTO: 1.42 10*3/MM3 (ref 0.7–3.1)
LYMPHOCYTES NFR BLD AUTO: 24.3 % (ref 19.6–45.3)
MCH RBC QN AUTO: 30.4 PG (ref 26.6–33)
MCHC RBC AUTO-ENTMCNC: 34.4 G/DL (ref 31.5–35.7)
MCV RBC AUTO: 88.5 FL (ref 79–97)
MONOCYTES # BLD AUTO: 0.46 10*3/MM3 (ref 0.1–0.9)
MONOCYTES NFR BLD AUTO: 7.9 % (ref 5–12)
NEUTROPHILS # BLD AUTO: 3.82 10*3/MM3 (ref 1.7–7)
NEUTROPHILS NFR BLD AUTO: 65.5 % (ref 42.7–76)
NRBC BLD AUTO-RTO: 0 /100 WBC (ref 0–0.2)
PLATELET # BLD AUTO: 200 10*3/MM3 (ref 140–450)
PMV BLD AUTO: 9.2 FL (ref 6–12)
POTASSIUM BLD-SCNC: 4.3 MMOL/L (ref 3.5–4.7)
PROT SERPL-MCNC: 7.9 G/DL (ref 6.3–8)
RBC # BLD AUTO: 4.01 10*6/MM3 (ref 4.14–5.8)
SODIUM BLD-SCNC: 142 MMOL/L (ref 134–145)
TIBC SERPL-MCNC: 336 MCG/DL (ref 249–505)
TRANSFERRIN SERPL-MCNC: 240 MG/DL (ref 200–360)
WBC NRBC COR # BLD: 5.84 10*3/MM3 (ref 3.4–10.8)

## 2019-12-16 PROCEDURE — 84466 ASSAY OF TRANSFERRIN: CPT | Performed by: INTERNAL MEDICINE

## 2019-12-16 PROCEDURE — 82746 ASSAY OF FOLIC ACID SERUM: CPT | Performed by: INTERNAL MEDICINE

## 2019-12-16 PROCEDURE — 82728 ASSAY OF FERRITIN: CPT | Performed by: INTERNAL MEDICINE

## 2019-12-16 PROCEDURE — 99205 OFFICE O/P NEW HI 60 MIN: CPT | Performed by: INTERNAL MEDICINE

## 2019-12-16 PROCEDURE — 85025 COMPLETE CBC W/AUTO DIFF WBC: CPT

## 2019-12-16 PROCEDURE — 36415 COLL VENOUS BLD VENIPUNCTURE: CPT | Performed by: INTERNAL MEDICINE

## 2019-12-16 PROCEDURE — 86706 HEP B SURFACE ANTIBODY: CPT | Performed by: INTERNAL MEDICINE

## 2019-12-16 PROCEDURE — 86803 HEPATITIS C AB TEST: CPT | Performed by: INTERNAL MEDICINE

## 2019-12-16 PROCEDURE — 80053 COMPREHEN METABOLIC PANEL: CPT | Performed by: INTERNAL MEDICINE

## 2019-12-16 PROCEDURE — 83615 LACTATE (LD) (LDH) ENZYME: CPT | Performed by: INTERNAL MEDICINE

## 2019-12-16 PROCEDURE — 36416 COLLJ CAPILLARY BLOOD SPEC: CPT

## 2019-12-16 PROCEDURE — 87340 HEPATITIS B SURFACE AG IA: CPT | Performed by: INTERNAL MEDICINE

## 2019-12-16 PROCEDURE — 99213 OFFICE O/P EST LOW 20 MIN: CPT | Performed by: INTERNAL MEDICINE

## 2019-12-16 PROCEDURE — 83540 ASSAY OF IRON: CPT | Performed by: INTERNAL MEDICINE

## 2019-12-16 NOTE — PROGRESS NOTES
Subjective   Miles Pierre is a 63 y.o.. male is here today for follow-up.    Chief Complaint   Patient presents with   • Elevated Hepatic Enzymes       History of Present Illness  Patient's been under evaluation for probable fatty liver.  He developed right axillary lymphadenopathy which was biopsied and was positive for melanoma, primary source not known.  He is scheduled for a PET.  Hepatitis panel has come back negative.  Liver chemistries are stable.  He has no specific complaints.    The following portions of the patient's history were reviewed and updated as appropriate: allergies, current medications, past family history, past medical history, past social history, past surgical history and problem list.      Current Outpatient Medications:   •  amLODIPine (NORVASC) 10 MG tablet, Take 1 tablet by mouth Daily., Disp: 90 tablet, Rfl: 1    Family History   Problem Relation Age of Onset   • Hypertension Father    • Hypertension Brother    • Cancer Brother         Liver   • Liver disease Brother    • Cancer Brother         Bladder   • Colon cancer Neg Hx    • Colon polyps Neg Hx        Review of Systems   Respiratory: Negative for shortness of breath.    Cardiovascular: Negative for chest pain.       Objective   Physical Exam   Constitutional: He is oriented to person, place, and time. He appears well-developed and well-nourished.   HENT:   Head: Normocephalic and atraumatic.   Right Ear: External ear normal.   Left Ear: External ear normal.   Eyes: Pupils are equal, round, and reactive to light. Conjunctivae and EOM are normal.   Pulmonary/Chest: Effort normal.   Neurological: He is alert and oriented to person, place, and time.   Psychiatric: He has a normal mood and affect. His behavior is normal. Judgment and thought content normal.   Nursing note and vitals reviewed.      Pertinent laboratory results were reviewed.  and Pertinent old records were reviewed.     Assessment/Plan   Problems Addressed this  Visit        Other    Abnormal transaminases - Primary    Malignant melanoma (CMS/HCC)        At this point we will follow along.  The PET scan may be helpful.  With his hepatitis profile being negative, I do not see any contraindication to immunotherapy or chemotherapy if desired.  He is to return as needed.

## 2019-12-16 NOTE — PROGRESS NOTES
Subjective     REASON FOR CONSULTATION:  Provide an opinion on any further workup or treatment on:    Metastatic melanoma to the right axillary lymph nodes                       REQUESTING PHYSICIAN: Rowena Motley APRN    RECORDS OBTAINED: Records of the patients history including those obtained from the referring provider were reviewed and summarized in detail.    HISTORY OF PRESENT ILLNESS:    Miles Pierre is a 63 y.o. patient who was referred for evaluation of metastatic melanoma to the right axillary lymph nodes.  Patient has history of multiple skin cancers mostly affecting the face.  He had multiple basal cell carcinomas that were excised by Dr. Carmona.  He has history of squamous cell carcinoma excised from the left forearm.  On review of his old records, there is a history of a junctional nevus excised from the left lower back on 12/1/2016.  Patient does not remember having any abnormal pigmented lesions, however.    Around October 2019, patient started noticing swelling in the right axillary area.  He started having discomfort.  He brought this to the attention of his primary care team.  He had an ultrasound of the axilla on 11/21/2019 which revealed a 9 cm right axillary mass likely a presenting enlarged lymph node.  He had a chest x-ray and a right breast mammogram done.  On 12/9/2019, he underwent ultrasound-guided needle biopsy.    Patient did not notice swelling of the right upper extremity.  He did not notice any difference in the size between the right and left hands.       REVIEW OF SYSTEMS:  Review of Systems   Constitutional: Negative for chills, fever and unexpected weight change.   HENT: Negative for mouth sores, nosebleeds, sore throat and voice change.    Eyes: Negative for visual disturbance.   Respiratory: Positive for cough and shortness of breath.    Cardiovascular: Negative for chest pain and leg swelling.   Gastrointestinal: Negative for abdominal pain, blood in stool,  constipation, diarrhea, nausea and vomiting.   Genitourinary: Negative for dysuria, frequency and hematuria.   Musculoskeletal: Negative for arthralgias, back pain and joint swelling.   Skin: Positive for color change. Negative for rash.        Patient has vitiligo affecting upper and lower extremities   Neurological: Negative for dizziness, numbness and headaches.   Hematological: Positive for adenopathy. Does not bruise/bleed easily.   Psychiatric/Behavioral: Negative for dysphoric mood. The patient is not nervous/anxious.          Past Medical History:   Diagnosis Date   • Anxiety    • Depression    • ED (erectile dysfunction)    • Hyperlipidemia    • Hypertension    • Osteoarthritis     Knee   • Skin cancer 2017    Basal cell carcinoma       Past Surgical History:   Procedure Laterality Date   • COLONOSCOPY     • COLONOSCOPY N/A 3/15/2019    Procedure: COLONOSCOPY to cecum:  cold snare polypectomies;  Surgeon: Sunil Farr MD;  Location: [facility] ENDOSCOPY;  Service: Gastroenterology   • SKIN CANCER EXCISION      basal cell   • US GUIDED LYMPH NODE BIOPSY  2019       Social History     Socioeconomic History   • Marital status: Single     Spouse name: Not on file   • Number of children: 3   • Years of education: Not on file   • Highest education level: Not on file   Occupational History   • 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 There is no history of Colon cancer.    MEDICATIONS:    Current Outpatient Medications:   •   "amLODIPine (NORVASC) 10 MG tablet, Take 1 tablet by mouth Daily., Disp: 90 tablet, Rfl: 1     ALLERGIES:  No Known Allergies     Objective   VITAL SIGNS:  Vitals:    12/16/19 0807   BP: 158/81   Pulse: 80   Resp: 18   Temp: 97.5 °F (36.4 °C)   TempSrc: Oral   SpO2: 97%   Weight: 103 kg (227 lb 1.6 oz)   Height: 190.2 cm (74.88\")  Comment: new   PainSc: 0-No pain       Wt Readings from Last 3 Encounters:   12/16/19 103 kg (227 lb 1.6 oz)   12/09/19 104 kg (230 lb)   11/25/19 106 kg (233 lb)       PHYSICAL EXAMINATION  GENERAL:  The patient appears in good general condition, not in acute distress.  SKIN: Warm and dry. No ecchymosis.  Vitiligo affecting forearms, hands and legs.  Spider angiomas in the neck and upper chest.  HEAD:  Normocephalic.  EYES:  No Jaundice. No Pallor. Pupils equal. EOMI.  MOUTH: No Ulcers. No Thrush. No Exudates.  NECK:  Supple with Good ROM. No Thyromegaly. No Masses.  LYMPHATICS: Right axillary lymph node measuring 10 cm, hard in consistency.  No other lymphadenopathy..  CHEST: Normal respiratory effort. Lungs clear to auscultation.   CARDIAC:  Normal S1 & S2. No murmur.  Trace edema.  ABDOMEN:  Soft. No tenderness.  Liver palpable 3 cm below costal margin. No Splenomegaly. No masses.  EXTREMITIES:  No clubbing. No deforming arthritis in the hands. No Calf tenderness.   NEUROLOGICAL:  No Focal neurological deficits.         RESULT REVIEW:   Results from last 7 days   Lab Units 12/16/19  0800   WBC 10*3/mm3 5.84   NEUTROS ABS 10*3/mm3 3.82   HEMOGLOBIN g/dL 12.2*   HEMATOCRIT % 35.5*   PLATELETS 10*3/mm3 200             Lab Results   Component Value Date    [id number] 328 09/30/2019     EXAMINATION: RIGHT AXILLARY SONOGRAPHY on 11/21/2019:     HISTORY: 63-year-old male with a history of skin cancer and an area of  palpable concern in the right axilla.      FINDINGS: Sonographic evaluation of the right axilla was performed.  There is a 9.2 x 4.8 x 9.1 cm dominant mass that is most consistent " with  an enlarged lymph node. Internal vascularity is noted.     IMPRESSION:  Dominant 9 cm right axillary mass which likely represents an  enlarged lymph node. Correlation with ultrasound guided biopsy of the  lymph node is recommended. This is highly suspicious for a malignant  lymph node which could be associated with the patient's prior skin  carcinoma or possibly a breast malignancy. Mammography may be indicated  as well.     An internal system message was sent to ZOE Juárez on  11/21/2019 at 4:44 PM.     This report was finalized on 11/21/2019 5:32 PM by Dr. Timothy Okeefe M.D.     PA AND LATERAL RADIOGRAPHIC VIEWS OF THE CHEST on 11/8/2019:     CLINICAL HISTORY: Cough.     FINDINGS: PA and lateral radiographic views of the chest demonstrate  clear lungs. The cardiomediastinal silhouette is within normal limits.  Osseous structures are incidentally notable for some degenerative  phenomena within the thoracic spine.     IMPRESSION:     No active disease in the chest.     This report was finalized on 11/8/2019 3:58 PM by Dr. Jimmie Valdovinos M.D.    DIAGNOSTIC BILATERAL MAMMOGRAM WITH R2 COMPUTERIZED ASSISTED DETECTION on 12/9/2019:     HISTORY: Mass in right axilla.     FINDINGS: Standard images and R2 computerized assisted detection were  obtained. The male breasts are symmetric with minimal subareolar  glandular tissue. There are no findings to suggest malignancy.     CONCLUSION: Negative bilateral mammogram.      BI-RADS Category 1: Negative.      This report was finalized on 12/9/2019 2:32 PM by Dr. Anastacio Husain M.D.     Pathology exam from 12/9/2019:  1. Right Axilla, Mass, U/S-Guided Core Needle Biopsy:               A. METASTATIC MELANOMA (see Comment).                 2. Right Axilla, Mass, U/S-Guided Core Needle Biopsy:               A. Entirely submitted for Flow Cytometry Analysis (see Flow Cytometry Summary).       Assessment/Plan   1.  Metastatic melanoma to the right axillary lymph  nodes.  Patient had ultrasound-guided biopsy on 12/9/2018 and pathology exam confirmed the diagnosis.  He does not have history of cutaneous melanoma to be suspected as the primary site.  However, he has history of a junctional nevus excised from the left lower back on 12/1/2016.  He had multiple skin lesions excised in the past but they were present and none melanoma skin cancers.    I explained the findings of the pathology exam to the patient.  I explained that the primary site is not clear.  It is not clear if he has other areas of involvement.  I recommended obtaining a PET scan to evaluate for other areas of metastasis.  I also recommended testing the specimen for BRAF, kit and enterovirus mutations as well as testing for PD-L1 expression.    2.  Normochromic normocytic anemia.  Hemoglobin is 12.2 today.  He had a low normal B12 of 328 on 9/30/2019.  After the lab test was obtained, he started B12 supplement.    3.  Elevated liver enzymes.  He was found to have fatty liver on ultrasound from 3/11/2019.    PLAN:    1.  I will obtain a CMP and LDH today.  2.  Obtain ferritin iron panel and folate.  3.  Hepatitis viral serology will be obtained to evaluate abnormal liver enzymes as recommended by his gastroenterologist.  4.  I will obtain a PET scan for initial staging.  5.  The pathology specimen will be sent to [facility] for  Next Generation Sequencing.    I will see the patient follow-up in 1 week to review the results.         Gillian Moran MD  12/16/19

## 2019-12-17 LAB — HBV CORE AB SER DONR QL IA: NEGATIVE

## 2019-12-19 ENCOUNTER — HOSPITAL ENCOUNTER (OUTPATIENT)
Dept: PET IMAGING | Facility: HOSPITAL | Age: 64
Discharge: HOME | End: 2019-12-19
Admitting: INTERNAL MEDICINE

## 2019-12-19 ENCOUNTER — HOSPITAL ENCOUNTER (OUTPATIENT)
Dept: PET IMAGING | Facility: HOSPITAL | Age: 64
Discharge: HOME | End: 2019-12-19

## 2019-12-19 DIAGNOSIS — C43.9 MALIGNANT MELANOMA, UNSPECIFIED SITE (HCC): ICD-10-CM

## 2019-12-19 DIAGNOSIS — C43.9 MALIGNANT MELANOMA METASTATIC TO LYMPH NODE (HCC): ICD-10-CM

## 2019-12-19 DIAGNOSIS — C77.9 MALIGNANT MELANOMA METASTATIC TO LYMPH NODE (HCC): ICD-10-CM

## 2019-12-19 DIAGNOSIS — C80.1: ICD-10-CM

## 2019-12-19 DIAGNOSIS — C79.89: ICD-10-CM

## 2019-12-19 LAB — GLUCOSE BLDC GLUCOMTR-MCNC: 116 MG/DL (ref 70–130)

## 2019-12-19 PROCEDURE — A9552 F18 FDG: HCPCS | Performed by: INTERNAL MEDICINE

## 2019-12-19 PROCEDURE — 78816 PET IMAGE W/CT FULL BODY: CPT

## 2019-12-19 PROCEDURE — 0 FLUDEOXYGLUCOSE F18 SOLUTION: Performed by: INTERNAL MEDICINE

## 2019-12-19 PROCEDURE — 82962 GLUCOSE BLOOD TEST: CPT

## 2019-12-19 RX ADMIN — FLUDEOXYGLUCOSE F18 1 DOSE: 300 INJECTION INTRAVENOUS at 07:37

## 2019-12-23 ENCOUNTER — OFFICE VISIT (OUTPATIENT)
Dept: ONCOLOGY | Facility: CLINIC | Age: 64
End: 2019-12-23

## 2019-12-23 ENCOUNTER — APPOINTMENT (OUTPATIENT)
Dept: LAB | Facility: HOSPITAL | Age: 64
End: 2019-12-23

## 2019-12-23 VITALS
SYSTOLIC BLOOD PRESSURE: 162 MMHG | HEIGHT: 75 IN | RESPIRATION RATE: 16 BRPM | HEART RATE: 90 BPM | DIASTOLIC BLOOD PRESSURE: 81 MMHG | BODY MASS INDEX: 28.41 KG/M2 | WEIGHT: 228.5 LBS | TEMPERATURE: 98 F | OXYGEN SATURATION: 94 %

## 2019-12-23 DIAGNOSIS — C43.9 MALIGNANT MELANOMA, UNSPECIFIED SITE (HCC): Primary | ICD-10-CM

## 2019-12-23 DIAGNOSIS — D52.9 ANEMIA DUE TO FOLIC ACID DEFICIENCY, UNSPECIFIED DEFICIENCY TYPE: ICD-10-CM

## 2019-12-23 DIAGNOSIS — C77.9 MALIGNANT MELANOMA METASTATIC TO LYMPH NODE (HCC): ICD-10-CM

## 2019-12-23 DIAGNOSIS — D51.9 ANEMIA DUE TO VITAMIN B12 DEFICIENCY, UNSPECIFIED B12 DEFICIENCY TYPE: ICD-10-CM

## 2019-12-23 DIAGNOSIS — C43.9 MALIGNANT MELANOMA METASTATIC TO LYMPH NODE (HCC): ICD-10-CM

## 2019-12-23 PROCEDURE — 99215 OFFICE O/P EST HI 40 MIN: CPT | Performed by: INTERNAL MEDICINE

## 2019-12-23 PROCEDURE — G0463 HOSPITAL OUTPT CLINIC VISIT: HCPCS | Performed by: INTERNAL MEDICINE

## 2019-12-23 NOTE — PROGRESS NOTES
Subjective     CHIEF COMPLAINT:      Chief Complaint   Patient presents with   • Follow-up     discuss pet scan       HISTORY OF PRESENT ILLNESS:     Miles Pierre is a 64 y.o. male patient who returns today for follow up on his malignant melanoma.  He returns today for follow-up after undergoing a PET scan.  He reports discomfort in the right axillary area.  He describes the pain as occasionally being in the form of a bee sting.  No swelling in the upper extremities.  No new areas of pain.        REVIEW OF SYSTEMS:  Review of Systems   Constitutional: Negative for chills, fever and unexpected weight change.   HENT: Negative for mouth sores, nosebleeds, sore throat and voice change.    Eyes: Negative for visual disturbance.   Respiratory: Negative for cough and shortness of breath.    Cardiovascular: Negative for chest pain and leg swelling.   Gastrointestinal: Negative for abdominal pain, blood in stool, constipation, diarrhea, nausea and vomiting.   Genitourinary: Negative for dysuria, frequency and hematuria.   Musculoskeletal: Positive for joint swelling. Negative for arthralgias and back pain.   Skin: Negative for rash.   Neurological: Negative for dizziness, numbness and headaches.   Hematological: Negative for adenopathy. Does not bruise/bleed easily.   Psychiatric/Behavioral: Negative for dysphoric mood. The patient is not nervous/anxious.      I verified the ROS obtained by the MA.      Past Medical History:   Diagnosis Date   • Anxiety    • Depression    • ED (erectile dysfunction)    • Hyperlipidemia    • Hypertension    • Osteoarthritis     Knee   • Skin cancer 05/2017    Basal cell carcinoma       Past Surgical History:   Procedure Laterality Date   • COLONOSCOPY     • COLONOSCOPY N/A 3/15/2019    Procedure: COLONOSCOPY to cecum:  cold snare polypectomies;  Surgeon: Sunil Farr MD;  Location: [facility] ENDOSCOPY;  Service: Gastroenterology   • SKIN CANCER EXCISION      basal cell   • US GUIDED  "LYMPH NODE BIOPSY  2019       Cancer-related family history includes Cancer in his brother and brother. There is no history of Colon cancer.  Social History     Tobacco Use   • Smoking status: Former Smoker     Packs/day: 0.50     Years: 23.00     Pack years: 11.50     Types: Cigars     Start date:      Last attempt to quit:      Years since quittin.9   • Smokeless tobacco: Never Used   Substance Use Topics   • Alcohol use: No       MEDICATIONS:    Current Outpatient Medications:   •  amLODIPine (NORVASC) 10 MG tablet, Take 1 tablet by mouth Daily., Disp: 90 tablet, Rfl: 1    ALLERGIES:  No Known Allergies      Objective   VITAL SIGNS:     Vitals:    19 1521   BP: 162/81   Pulse: 90   Resp: 16   Temp: 98 °F (36.7 °C)   TempSrc: Oral   SpO2: 94%   Weight: 104 kg (228 lb 8 oz)   Height: 190.2 cm (74.88\")   PainSc: 0-No pain     Body mass index is 28.65 kg/m².     Wt Readings from Last 3 Encounters:   19 104 kg (228 lb 8 oz)   19 103 kg (227 lb 1.6 oz)   19 104 kg (230 lb)       PHYSICAL EXAMINATION:  GENERAL:  The patient appears in good general condition, not in acute distress.  SKIN: Warm and dry.  Spider angiomas over the face neck and upper chest. No ecchymosis.  HEAD:  Normocephalic.  EYES:  No Jaundice. No Pallor. Pupils equal. EOMI.  NECK:  Supple with Good ROM. No Thyromegaly. No Masses.  LYMPHATICS: Right axillary lymph node mass measuring 10 cm, hard in consistency.  No cervical or supraclavicular lymphadenopathy.  CHEST: Normal respiratory effort. Lungs clear to auscultation.   CARDIAC:  Normal S1 & S2. No murmur. No edema.  ABDOMEN:  Soft. No tenderness. No Hepatomegaly. No Splenomegaly. No masses.  EXTREMITIES:  No clubbing. No deforming arthritis in the hands. No Calf tenderness.  NEUROLOGICAL:  No Focal neurological deficits.       DIAGNOSTIC DATA:       Component      Latest Ref Rng & Units 2019   Iron      59 - 158 mcg/dL  86   Iron " Saturation      14 - 48 %  26   Transferrin      200 - 360 mg/dL  240   TIBC      249 - 505 mcg/dL  336   Vitamin B-12      211 - 946 pg/mL 328    LDH      99 - 259 U/L  311 (H)   Ferritin      30.00 - 400.00 ng/mL  259.50   Hepatitis C Ab      Non-Reactive  Non-Reactive   Hep B Core Total Ab      Negative  Negative   Hep B S Ab      Non-Reactive  Non-Reactive   Hepatitis B Surface Ag      Non-Reactive  Non-Reactive   Folate      4.78 - 24.20 ng/mL  6.44          WHOLE-BODY FDG PET IMAGING on 12/19/2019:     HISTORY: Melanoma. Restaging.     TECHNIQUE: Blood glucose level at time of injection of FDG was 116  mg/dl.  5.7 mCi of FDG was injected. Approximately 90 minutes later, PET  images were obtained. CT images were acquired for attenuation correction  and anatomic localization.     COMPARISON: None     FINDINGS: There is a large bk mass in the inferior aspect of the  right axilla that measures 10.7 x 4.1 cm in diameter. The mass  demonstrates intense hypermetabolism with maximum measured SUV in the  range of 17.7 g/mL. This corresponds to the patient's biopsy-proven  metastatic melanoma.  There is no metabolic evidence of metastatic  disease elsewhere within the body. There are small bilateral posteriorly  layering pleural effusions that demonstrate no associated  hypermetabolism. Incidental note is made of fairly extensive brown fat  uptake.     IMPRESSION:  Large bk mass in the inferior aspect of the right axilla  demonstrating intense hypermetabolism. There is no PET/CT evidence of  metastatic disease elsewhere within the body.     This report was finalized on 12/20/2019 3:04 PM by Dr. Rafat Nugent M.D.    I personally reviewed the PET scan with the patient during today's visit.  I concur with the finding of significant hypermetabolism in the right axillary lymph nodes.  No evidence of other areas of metastasis.  Specifically, no abnormal activity in the liver.    Assessment/Plan   1.  Metastatic  melanoma to the right axillary lymph nodes.    · Patient had ultrasound-guided biopsy on 12/9/2019.  · Pathology exam confirmed the diagnosis of metastatic melanoma.    · He does not have history of cutaneous melanoma to be suspected as the primary site.    · However, he has history of a junctional nevus excised from the left lower back on 12/1/2016.    · PET scan on 12/19/2019 showed hypermetabolism in the right axilla consistent with a metastasis.  However, no evidence of metastatic disease.  · I explained to the patient the limitation of the PET scan namely not showing lesions <5 mm in size.  · Based on the absence of other areas of disease, I recommended right axillary lymph node dissection.  I explained the rationale behind the surgical treatment and explained that pathology exam with help in determining if radiation therapy with also be needed after surgery.  He expressed his understanding and agreed to proceed.    2.  Anemia secondary to B12 and folate deficiency.  He started B12 1000 mcg recently.  I asked him to check on his current vitamin to make sure it has at least 400 mcg of folic acid.    3.  Elevated liver enzymes.  He was found to have fatty liver on ultrasound from 3/11/2019.  No evidence of metastatic disease to the liver.  Testing for hepatitis B and C was negative.  No evidence of iron overload disorder.  He follows with Dr. Farr.    PLAN:    1.  I will refer to Dr. Tuttle at [facility] for right axillary lymph node dissection.    2.  I will see the patient in follow-up in 1 month.  We will review the pathology exam results and make a recommendation regarding radiation therapy accordingly.    3.  I recommended adjuvant therapy.  If his genomic testing shows BRAF mutation, we would recommend combination treatment with Dabrafenib/Trametinib.  If the testing is negative, would recommend immunotherapy with Opdivo for 1 year.    4.  Continue B12 daily.  I asked him to make  sure he is taking a minimum of400 mcg daily of folic acid in his multivitamin.        Gillian Moran MD  12/23/19

## 2019-12-27 LAB
CYTO UR: NORMAL
LAB AP CASE REPORT: NORMAL
LAB AP CLINICAL INFORMATION: NORMAL
LAB AP DIAGNOSIS COMMENT: NORMAL
LAB AP FLOW CYTOMETRY SUMMARY: NORMAL
Lab: NORMAL
Lab: NORMAL
PATH REPORT.ADDENDUM SPEC: NORMAL
PATH REPORT.FINAL DX SPEC: NORMAL
PATH REPORT.GROSS SPEC: NORMAL

## 2019-12-30 ENCOUNTER — TELEPHONE (OUTPATIENT)
Dept: INTERNAL MEDICINE | Age: 64
End: 2019-12-30

## 2019-12-30 ENCOUNTER — TRANSCRIBE ORDERS (OUTPATIENT)
Dept: ADMINISTRATIVE | Facility: HOSPITAL | Age: 64
End: 2019-12-30

## 2019-12-30 DIAGNOSIS — Z85.820 HISTORY OF MELANOMA: Primary | ICD-10-CM

## 2019-12-30 DIAGNOSIS — R51.9 NEW ONSET OF HEADACHES: ICD-10-CM

## 2019-12-30 DIAGNOSIS — I10 ESSENTIAL HYPERTENSION: Primary | ICD-10-CM

## 2019-12-30 RX ORDER — VALSARTAN 40 MG/1
40 TABLET ORAL DAILY
Qty: 90 TABLET | Refills: 3 | Status: SHIPPED | OUTPATIENT
Start: 2019-12-30 | End: 2020-01-10

## 2019-12-30 RX ORDER — AMLODIPINE BESYLATE 5 MG/1
5 TABLET ORAL DAILY
Qty: 90 TABLET | Refills: 3 | Status: SHIPPED | OUTPATIENT
Start: 2019-12-30 | End: 2021-03-22

## 2019-12-30 NOTE — TELEPHONE ENCOUNTER
----- Message from Liss Porras MA sent at 12/30/2019  1:26 PM EST -----  Regarding: FW: Non-Urgent Medical Question  Contact: [phone]      ----- Message -----  From: Miles Pierre  Sent: 12/30/2019  12:48 PM EST  To: Brandyn Morales [id number] Clinical Pool  Subject: Non-Urgent Medical Question                      Steve Guaman,    I hope you had a Happy Holiday.     We need to try another blood pressure medication. The amlodipine is causing my ankles and feet to swell.     Please let me know what you think.     Thanks,  Todd

## 2019-12-30 NOTE — TELEPHONE ENCOUNTER
Contact patient.     Swelling did not start until dose increased to 10mg, correct? May be able to tolerate 5mg amlodipine (as he was on this previously). Will add valsartan 40mg daily to this.     Recommend follow up in office BP recheck ONLY 2 weeks with Corin.    Abdomen soft, non-tender, no guarding.

## 2020-01-10 ENCOUNTER — OFFICE VISIT (OUTPATIENT)
Dept: INTERNAL MEDICINE | Age: 65
End: 2020-01-10

## 2020-01-10 VITALS
DIASTOLIC BLOOD PRESSURE: 76 MMHG | SYSTOLIC BLOOD PRESSURE: 172 MMHG | BODY MASS INDEX: 27.95 KG/M2 | TEMPERATURE: 99.1 F | HEART RATE: 82 BPM | HEIGHT: 75 IN | WEIGHT: 224.8 LBS | OXYGEN SATURATION: 98 %

## 2020-01-10 DIAGNOSIS — C43.9 MALIGNANT MELANOMA METASTATIC TO LYMPH NODE (HCC): ICD-10-CM

## 2020-01-10 DIAGNOSIS — J06.9 VIRAL UPPER RESPIRATORY TRACT INFECTION: Primary | ICD-10-CM

## 2020-01-10 DIAGNOSIS — C77.9 MALIGNANT MELANOMA METASTATIC TO LYMPH NODE (HCC): ICD-10-CM

## 2020-01-10 DIAGNOSIS — I10 ESSENTIAL HYPERTENSION: ICD-10-CM

## 2020-01-10 PROCEDURE — 99214 OFFICE O/P EST MOD 30 MIN: CPT | Performed by: NURSE PRACTITIONER

## 2020-01-10 RX ORDER — VALSARTAN 80 MG/1
80 TABLET ORAL DAILY
Qty: 90 TABLET | Refills: 2 | Status: SHIPPED | OUTPATIENT
Start: 2020-01-10 | End: 2020-12-01

## 2020-01-10 NOTE — PROGRESS NOTES
[facility]  Rowena Pierre / 64 y.o. / male  01/10/2020      ASSESSMENT & PLAN:    Problem List Items Addressed This Visit        Cardiovascular and Mediastinum    Hypertension    Relevant Medications    amLODIPine (NORVASC) 5 MG tablet    valsartan (DIOVAN) 80 MG tablet       Immune and Lymphatic    Malignant melanoma metastatic to lymph node (CMS/HCC)      Other Visit Diagnoses     Viral upper respiratory tract infection    -  Primary        No orders of the defined types were placed in this encounter.    New Medications Ordered This Visit   Medications   • valsartan (DIOVAN) 80 MG tablet     Sig: Take 1 tablet by mouth Daily.     Dispense:  90 tablet     Refill:  2       Summary/Discussion:    1. Viral upper respiratory tract infection  No Evidence or concern for flu symptoms on exam today.  Discussed appropriate conservative and symptomatic treatment with patient, including use of NSAIDs or Tylenol for fever or pain, increase by mouth fluids, and rest. Discussed when to follow up for recheck, including worsening symptoms such as fever, purulent nasal drainage, worsening cough, productive cough, etc.       2. Malignant melanoma metastatic to lymph node (CMS/HCC)      3. Essential hypertension  Blood pressure remains suboptimally controlled.  Recommend increase valsartan 80 mg daily.  Recommend patient follow-up in office in 2 weeks for blood pressure recheck with my medical assistant.  Recommended patient bring home blood pressure cuff in with him to visit for calibration.    - valsartan (DIOVAN) 80 MG tablet; Take 1 tablet by mouth Daily.  Dispense: 90 tablet; Refill: 2        Return in about 2 weeks (around 1/24/2020) for BP check with Corin .    ____________________________________________________________________    MEDICATIONS  Current Outpatient Medications   Medication Sig Dispense Refill   • amLODIPine (NORVASC) 5 MG tablet Take 1 tablet by mouth Daily. 90 tablet 3  "  • valsartan (DIOVAN) 80 MG tablet Take 1 tablet by mouth Daily. 90 tablet 2     No current facility-administered medications for this visit.           VITALS:    Visit Vitals  /76   Pulse 82   Temp 99.1 °F (37.3 °C)   Ht 190.2 cm (74.88\")   Wt 102 kg (224 lb 12.8 oz)   SpO2 98%   BMI 28.19 kg/m²       BP Readings from Last 3 Encounters:   01/10/20 172/76   12/23/19 162/81   12/16/19 158/81     Wt Readings from Last 3 Encounters:   01/10/20 102 kg (224 lb 12.8 oz)   12/23/19 104 kg (228 lb 8 oz)   12/16/19 103 kg (227 lb 1.6 oz)      Body mass index is 28.19 kg/m².    CC:  Main reason(s) for today's visit: Nasal Congestion (Clear discharge ); Sore Throat; and Cough      HPI:     Cough: Patient complains of nasal congestion, rhinorrhea (clear) and sore throat.  Symptoms began 3 days ago.  The cough is non-productive and is aggravated by nothing. Patient denies body aches, fever/chills, N/V/D.  Patient does not have new pets. Patient does not have a history of asthma. Patient does not have a history of environmental allergens. Patient does not have recent travel. Patient does have a history of smoking.   He is having viral injections next week for metastatic melanoma of the right axillary region and wants to make sure that he is well for treatment.   He has had flu vaccination this season. He does not have any known exposure to the flu.   He has been taking Mucinex, no decongestants.     Hypertension: Patient here for follow-up of elevated blood pressure. He is not exercising and is not adherent to low salt diet.  Blood pressure is not well controlled at home. Cardiac symptoms none. Patient denies chest pain, dyspnea, exertional chest pressure/discomfort, lower extremity edema and palpitations.  Cardiovascular risk factors: advanced age (older than 55 for men, 65 for women), hypertension and male gender. Use of agents associated with hypertension: none. History of target organ damage: none.  He was seen by me " on 12/23 and BP significantly elevated at 162/81. At that time, increased amlodipine to 10mg but he subsequently developed swelling which led me to reduce amlodipine back to 5mg and add diovan 40mg. Patient reports home BP have been 140/70s in general.       Patient Care Team:  Rowena Motley APRN as PCP - General (Internal Medicine)  Rowena Motley APRN as Referring Physician (Internal Medicine)  Gillian Moran MD as Consulting Physician (Hematology and Oncology)  Daniel Carmona MD as Consulting Physician (Dermatology)  Corin Tuttle MD (Surgical Oncology)    ____________________________________________________________________    REVIEW OF SYSTEMS    Review of Systems   Constitutional: Negative for activity change, appetite change, chills, fever and unexpected weight change.   HENT: Positive for congestion and rhinorrhea. Negative for ear pain, postnasal drip, sinus pressure, sneezing, sore throat, tinnitus and trouble swallowing.    Eyes: Negative for discharge, redness, itching and visual disturbance.   Respiratory: Positive for cough. Negative for chest tightness, shortness of breath and wheezing.    Cardiovascular: Negative for chest pain, palpitations and leg swelling.   Gastrointestinal: Negative for diarrhea, nausea and vomiting.   Musculoskeletal: Negative for arthralgias and myalgias.   Neurological: Negative for dizziness, light-headedness and headaches.   Hematological: Negative for adenopathy.         PHYSICAL EXAMINATION    Physical Exam   Constitutional: He is oriented to person, place, and time. Vital signs are normal. He appears well-developed and well-nourished. He is cooperative. He does not appear ill. No distress.   HENT:   Head: Normocephalic and atraumatic.   Right Ear: Hearing, tympanic membrane, external ear and ear canal normal. No drainage or swelling. Tympanic membrane is not injected and not erythematous. No middle ear effusion.   Left Ear: Hearing, tympanic membrane,  external ear and ear canal normal. No drainage or swelling. Tympanic membrane is not injected and not erythematous.  No middle ear effusion.   Nose: Nose normal.   Mouth/Throat: Uvula is midline, oropharynx is clear and moist and mucous membranes are normal. No tonsillar exudate.   Cardiovascular: Normal rate, regular rhythm and normal heart sounds.   No murmur heard.  Pulmonary/Chest: Effort normal and breath sounds normal. He has no decreased breath sounds. He has no wheezes. He has no rhonchi. He has no rales.   Lymphadenopathy:     He has no cervical adenopathy.        Right cervical: No superficial cervical, no deep cervical and no posterior cervical adenopathy present.       Left cervical: No superficial cervical, no deep cervical and no posterior cervical adenopathy present.   Neurological: He is alert and oriented to person, place, and time.   Skin: Skin is warm, dry and intact.   Psychiatric: He has a normal mood and affect. His speech is normal and behavior is normal. Judgment and thought content normal. Cognition and memory are normal.   Nursing note and vitals reviewed.      REVIEWED DATA:    Labs:     Lab Results   Component Value Date     12/16/2019    K 4.3 12/16/2019    AST 52 (H) 12/16/2019    ALT 26 12/16/2019    BUN 13 12/16/2019    CREATININE 0.66 (L) 12/16/2019    CREATININE 0.65 (L) 11/08/2019    CREATININE 0.65 (L) 09/30/2019    EGFRIFNONA 121 12/16/2019    EGFRIFAFRI 149 11/08/2019       Lab Results   Component Value Date    HGBA1C 5.8 (H) 02/14/2019    HGBA1C 6.00 (H) 07/24/2017    HGBA1C 5.5 09/04/2015    GLUCOSE 114 12/16/2019       Lab Results   Component Value Date     (H) 08/26/2019     (H) 04/29/2019     (H) 11/28/2017    HDL 27 (L) 08/26/2019    HDL 38 (L) 04/29/2019    HDL 47 11/28/2017    TRIG 150 08/26/2019    TRIG 131 04/29/2019    TRIG 105 11/28/2017    CHOLHDLRATIO 6.26 08/26/2019    CHOLHDLRATIO 4.71 04/29/2019       Lab Results   Component Value Date     TSH 2.560 11/08/2019    FREET4 1.09 08/30/2017       Lab Results   Component Value Date    WBC 5.84 12/16/2019    HGB 12.2 (L) 12/16/2019    HGB 13.1 11/08/2019    HGB 13.7 09/30/2019     12/16/2019       Lab Results   Component Value Date    PROTEIN Negative 11/28/2017    GLUCOSEU Negative 11/28/2017    BLOODU Negative 11/28/2017    NITRITEU Negative 11/28/2017    LEUKOCYTESUR Negative 11/28/2017       Imaging:         Medical Tests:         Summary of old records / correspondence / consultant report:         Request outside records:         ALLERGIES  No Known Allergies     PFSH:     The following portions of the patient's history were reviewed and updated as appropriate: Allergies / Current Medications / Past Medical History / Surgical History / Social History / Family History    PROBLEM LIST   Patient Active Problem List   Diagnosis   • Impaired fasting glucose   • Hypertension   • Hyperlipidemia   • Impotence of organic origin   • Osteoarthritis of knee   • Tear of medial meniscus of knee   • Obesity (BMI 30-39.9)   • Routine health maintenance   • Nocturia   • Abnormal laboratory test   • Other sleep apnea   • Lower GI bleed   • Abnormal transaminases   • Rectal bleed   • Fatty liver   • Malignant melanoma (CMS/HCC)   • Malignant melanoma metastatic to lymph node (CMS/HCC)       PAST MEDICAL HISTORY  Past Medical History:   Diagnosis Date   • Anxiety    • Depression    • ED (erectile dysfunction)    • Hyperlipidemia    • Hypertension    • Osteoarthritis     Knee   • Skin cancer 05/2017    Basal cell carcinoma       SURGICAL HISTORY  Past Surgical History:   Procedure Laterality Date   • COLONOSCOPY     • COLONOSCOPY N/A 3/15/2019    Procedure: COLONOSCOPY to cecum:  cold snare polypectomies;  Surgeon: Sunil Farr MD;  Location: [facility] ENDOSCOPY;  Service: Gastroenterology   • SKIN CANCER EXCISION      basal cell   • US GUIDED LYMPH NODE BIOPSY  12/9/2019       SOCIAL HISTORY  Social History      Socioeconomic History   • Marital status: Single     Spouse name: Not on file   • Number of children: 3   • Years of education: High school   • Highest education level: Not on file   Occupational History   • Occupation: 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     Employer: [employer]   Tobacco Use   • Smoking status: Former Smoker     Packs/day: 0.50     Years: 23.00     Pack years: 11.50     Types: Cigars     Start date:      Last attempt to quit:      Years since quittin.0   • Smokeless tobacco: Never Used   Substance and Sexual Activity   • Alcohol use: No   • Drug use: No   • Sexual activity: Never   Social History Narrative    Single       FAMILY HISTORY  Family History   Problem Relation Age of Onset   • Hypertension Father    • Hypertension Brother    • Cancer Brother         Liver   • Liver disease Brother    • Cancer Brother         Bladder   • Colon cancer Neg Hx    • Colon polyps Neg Hx          **Dragon Disclaimer:   Much of this encounter note is an electronic transcription/translation of spoken language to printed text. The electronic translation of spoken language may permit erroneous, or at times, nonsensical words or phrases to be inadvertently transcribed. Although I have reviewed the note for such errors, some may still exist.

## 2020-01-10 NOTE — PATIENT INSTRUCTIONS
"Upper Respiratory Infection, Adult  An upper respiratory infection (URI) affects the nose, throat, and upper air passages. URIs are caused by germs (viruses). The most common type of URI is often called \"the common cold.\"  Medicines cannot cure URIs, but you can do things at home to relieve your symptoms. URIs usually get better within 7-10 days.  Follow these instructions at home:  Activity  · Rest as needed.  · If you have a fever, stay home from work or school until your fever is gone, or until your doctor says you may return to work or school.  ? You should stay home until you cannot spread the infection anymore (you are not contagious).  ? Your doctor may have you wear a face mask so you have less risk of spreading the infection.  Relieving symptoms  · Gargle with a salt-water mixture 3-4 times a day or as needed. To make a salt-water mixture, completely dissolve ½-1 tsp of salt in 1 cup of warm water.  · Use a cool-mist humidifier to add moisture to the air. This can help you breathe more easily.  Eating and drinking    · Drink enough fluid to keep your pee (urine) pale yellow.  · Eat soups and other clear broths.  General instructions    · Take over-the-counter and prescription medicines only as told by your doctor. These include cold medicines, fever reducers, and cough suppressants.  · Do not use any products that contain nicotine or tobacco. These include cigarettes and e-cigarettes. If you need help quitting, ask your doctor.  · Avoid being where people are smoking (avoid secondhand smoke).  · Make sure you get regular shots and get the flu shot every year.  · Keep all follow-up visits as told by your doctor. This is important.  How to avoid spreading infection to others    · Wash your hands often with soap and water. If you do not have soap and water, use hand .  · Avoid touching your mouth, face, eyes, or nose.  · Cough or sneeze into a tissue or your sleeve or elbow. Do not cough or sneeze " "into your hand or into the air.  Contact a doctor if:  · You are getting worse, not better.  · You have any of these:  ? A fever.  ? Chills.  ? Brown or red mucus in your nose.  ? Yellow or brown fluid (discharge)coming from your nose.  ? Pain in your face, especially when you bend forward.  ? Swollen neck glands.  ? Pain with swallowing.  ? White areas in the back of your throat.  Get help right away if:  · You have shortness of breath that gets worse.  · You have very bad or constant:  ? Headache.  ? Ear pain.  ? Pain in your forehead, behind your eyes, and over your cheekbones (sinus pain).  ? Chest pain.  · You have long-lasting (chronic) lung disease along with any of these:  ? Wheezing.  ? Long-lasting cough.  ? Coughing up blood.  ? A change in your usual mucus.  · You have a stiff neck.  · You have changes in your:  ? Vision.  ? Hearing.  ? Thinking.  ? Mood.  Summary  · An upper respiratory infection (URI) is caused by a germ called a virus. The most common type of URI is often called \"the common cold.\"  · URIs usually get better within 7-10 days.  · Take over-the-counter and prescription medicines only as told by your doctor.  This information is not intended to replace advice given to you by your health care provider. Make sure you discuss any questions you have with your health care provider.  Document Released: 06/05/2009 Document Revised: 08/10/2018 Document Reviewed: 08/10/2018  [employer] Interactive Patient Education © 2019 [employer]    "

## 2020-01-13 ENCOUNTER — HOSPITAL ENCOUNTER (OUTPATIENT)
Dept: MRI IMAGING | Facility: HOSPITAL | Age: 65
Discharge: HOME | End: 2020-01-13
Admitting: SURGERY

## 2020-01-13 DIAGNOSIS — Z85.820 HISTORY OF MELANOMA: ICD-10-CM

## 2020-01-13 DIAGNOSIS — R51.9 NEW ONSET OF HEADACHES: ICD-10-CM

## 2020-01-13 PROCEDURE — A9577 INJ MULTIHANCE: HCPCS | Performed by: SURGERY

## 2020-01-13 PROCEDURE — 82565 ASSAY OF CREATININE: CPT

## 2020-01-13 PROCEDURE — 0 GADOBENATE DIMEGLUMINE 529 MG/ML SOLUTION: Performed by: SURGERY

## 2020-01-13 PROCEDURE — 70553 MRI BRAIN STEM W/O & W/DYE: CPT

## 2020-01-13 RX ADMIN — GADOBENATE DIMEGLUMINE 20 ML: 529 INJECTION, SOLUTION INTRAVENOUS at 21:05

## 2020-01-14 LAB — CREAT BLDA-MCNC: 0.6 MG/DL (ref 0.6–1.3)

## 2020-03-05 ENCOUNTER — TELEPHONE (OUTPATIENT)
Dept: INTERNAL MEDICINE | Age: 65
End: 2020-03-05

## 2020-03-05 NOTE — TELEPHONE ENCOUNTER
PATIENT STATES THAT HIS LIVER ENZYMES ARE HIGH AND HE STATES HE DO BELIEVE THAT IT IS BECAUSE OF THE VALSARTAN 80 MG AND AMLODIPINE 5 MG MEDICATION THAT IS HE TAKING AND WOULD LIKE TO KNOW IF RADHA WOULD SUGGEST A DIFFERENT MEDICATION HE SHOULD BE TAKING? PLEASE ADVISE AND CALL PT BACK [phone].

## 2020-03-05 NOTE — TELEPHONE ENCOUNTER
Contact patient.     I feel that it is highly unlikely that his abnormal liver enzymes are related to either of these medications.     The risk of elevated liver enzymes with either of these medications is less than 1%. From the ultrasound that we did last year, it does appear that you have some degree of fatty liver, which is more likely. I do not think we need to change these medications at this point.

## 2020-06-22 ENCOUNTER — OFFICE VISIT (OUTPATIENT)
Dept: INTERNAL MEDICINE | Age: 65
End: 2020-06-22

## 2020-06-22 VITALS
WEIGHT: 231 LBS | DIASTOLIC BLOOD PRESSURE: 78 MMHG | SYSTOLIC BLOOD PRESSURE: 122 MMHG | OXYGEN SATURATION: 97 % | HEIGHT: 75 IN | HEART RATE: 81 BPM | TEMPERATURE: 97.1 F | BODY MASS INDEX: 28.72 KG/M2

## 2020-06-22 DIAGNOSIS — E78.5 HYPERLIPIDEMIA, UNSPECIFIED HYPERLIPIDEMIA TYPE: ICD-10-CM

## 2020-06-22 DIAGNOSIS — R73.01 IMPAIRED FASTING GLUCOSE: ICD-10-CM

## 2020-06-22 DIAGNOSIS — Z12.5 PROSTATE CANCER SCREENING: ICD-10-CM

## 2020-06-22 DIAGNOSIS — I10 ESSENTIAL HYPERTENSION: Primary | ICD-10-CM

## 2020-06-22 DIAGNOSIS — Z23 ENCOUNTER FOR IMMUNIZATION: ICD-10-CM

## 2020-06-22 PROCEDURE — 99214 OFFICE O/P EST MOD 30 MIN: CPT | Performed by: NURSE PRACTITIONER

## 2020-06-22 PROCEDURE — 90732 PPSV23 VACC 2 YRS+ SUBQ/IM: CPT | Performed by: NURSE PRACTITIONER

## 2020-06-22 PROCEDURE — G0009 ADMIN PNEUMOCOCCAL VACCINE: HCPCS | Performed by: NURSE PRACTITIONER

## 2020-06-22 RX ORDER — HYDROXYZINE HYDROCHLORIDE 25 MG/1
25 TABLET, FILM COATED ORAL 3 TIMES DAILY PRN
COMMUNITY

## 2020-06-22 NOTE — PROGRESS NOTES
[facility] [place] [facility]  Rowena Pierre / 64 y.o. / male  06/22/2020      ASSESSMENT & PLAN:    Problem List Items Addressed This Visit        Cardiovascular and Mediastinum    Hypertension - Primary    Relevant Medications    amLODIPine (NORVASC) 5 MG tablet    valsartan (DIOVAN) 80 MG tablet    Hyperlipidemia       Endocrine    Impaired fasting glucose    Relevant Orders    Hemoglobin A1c      Other Visit Diagnoses     Prostate cancer screening        Relevant Orders    PSA Screen    Encounter for immunization        Relevant Orders    Pneumococcal Polysaccharide Vaccine 23-Valent Greater Than or Equal To 1yo Subcutaneous / IM (Completed)        Orders Placed This Encounter   Procedures   • Pneumococcal Polysaccharide Vaccine 23-Valent Greater Than or Equal To 1yo Subcutaneous / IM   • PSA Screen   • Hemoglobin A1c     No orders of the defined types were placed in this encounter.      Summary/Discussion:    1. Essential hypertension  Blood pressure stable on current therapy, continue same.  Check labs today and adjust dosage of medication as necessary.  Follow-up 4 months.    Continue lifestyle modifications for high blood pressure, high cholesterol, and increased weight. Decrease/eliminate soda, caffeine, alcohol and overall caloric intake. Reduce carbohydrates and sweets in diet.  Continue to improve dietary habits with lean proteins, fresh vegetables, fruits, and nuts. Improve aerobic exercise: walking/biking/swimming daily as tolerated, recommend 30 minutes/day at least 5 days/week.       2. Hyperlipidemia, unspecified hyperlipidemia type   Fasting lipid panel UTD and values within acceptable ranges.   Continue to follow and improve on low-fat, low carbohydrate diet.       3. Prostate cancer screening    - PSA Screen    4. Impaired fasting glucose    - Hemoglobin A1c    5. Encounter for immunization    - Pneumococcal Polysaccharide Vaccine 23-Valent Greater Than or Equal To 1yo  "Subcutaneous / IM        Return in about 4 months (around 10/22/2020) for Next scheduled follow up.  ____________________________________________________________________    MEDICATIONS  Current Outpatient Medications   Medication Sig Dispense Refill   • amLODIPine (NORVASC) 5 MG tablet Take 1 tablet by mouth Daily. 90 tablet 3   • hydrOXYzine (ATARAX) 25 MG tablet Take 25 mg by mouth 3 (Three) Times a Day As Needed for Itching.     • valsartan (DIOVAN) 80 MG tablet Take 1 tablet by mouth Daily. 90 tablet 2     No current facility-administered medications for this visit.        VITALS    Visit Vitals  /78   Pulse 81   Temp 97.1 °F (36.2 °C) (Temporal)   Ht 190.2 cm (74.88\")   Wt 105 kg (231 lb)   SpO2 97%   BMI 28.96 kg/m²       BP Readings from Last 3 Encounters:   06/22/20 122/78   01/10/20 172/76   12/23/19 162/81     Wt Readings from Last 3 Encounters:   06/22/20 105 kg (231 lb)   01/10/20 102 kg (224 lb 12.8 oz)   12/23/19 104 kg (228 lb 8 oz)      Body mass index is 28.96 kg/m².    CC:  Main reason(s) for today's visit: Follow-up for hypertension    HPI:   Chronic essential hypertension:  Since prior visit: compliant with medication(s), checks blood pressure occasionally and denies significant problems with medication(s). Currently taking amlodipine (Norvase) and valsartan (Diovan). He is not exercising, but is adherent to low sodium diet.  He denies symptoms of chest pain/pressure, dyspnea, swelling, vision impairment. CVD risk factors include: advanced age (>55 for males, >65 for females), dyslipidemia, HTN, obesity (BMI>=30 kg/m2), and sedentary lifestyle. Use of agents associated with HTN: no tobacco use . Most recent in-office blood pressure readings:   BP Readings from Last 3 Encounters:   06/22/20 122/78   01/10/20 172/76   12/23/19 162/81       Chronic hyperlipidemia:  Current therapy include no prior medication.    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 Patient reports has upcoming PET CT In the next month.     Patient Care Team:  Rowena Motley APRN as PCP - General (Internal Medicine)  Rowena Motley APRN as Referring Physician (Internal Medicine)  Gillian Moran MD as Consulting Physician (Hematology and Oncology)  Daniel Carmona MD as Consulting Physician (Dermatology)  Corin Tuttle MD (Surgical Oncology)  ____________________________________________________________________      REVIEW OF SYSTEMS    Review of Systems   Constitutional: Negative for activity change, appetite change and unexpected weight change.   HENT: Negative for tinnitus.    Eyes: Negative for visual disturbance.   Respiratory: Negative for cough, chest tightness and shortness of breath.    Cardiovascular: Negative for chest pain, palpitations and leg swelling.   Neurological: Negative for dizziness, light-headedness and headaches.         PHYSICAL EXAMINATION    Physical Exam   Constitutional: He is oriented to person, place, and time. Vital signs are normal. He appears well-developed and well-nourished. He is cooperative. He does not appear ill. No distress.   Cardiovascular: Normal rate, regular rhythm, S1 normal, S2 normal and normal heart sounds.   No murmur heard.  Pulmonary/Chest: Effort normal and breath sounds normal. He has no decreased breath sounds. He has no wheezes. He has no rhonchi. He has no rales.   Neurological: He is alert and oriented to person, place, and time.   Skin: Skin is warm, dry and intact.   Psychiatric: He has a normal mood and affect. His speech is normal and behavior is normal. Judgment and thought content normal. Cognition and memory are normal.    Nursing note and vitals reviewed.      REVIEWED DATA:    Labs:   Lab Results   Component Value Date     12/16/2019    K 4.3 12/16/2019    AST 52 (H) 12/16/2019    ALT 26 12/16/2019    BUN 13 12/16/2019    CREATININE 0.60 01/13/2020    CREATININE 0.66 (L) 12/16/2019    CREATININE 0.65 (L) 11/08/2019    EGFRIFNONA 121 12/16/2019    EGFRIFAFRI 149 11/08/2019       Lab Results   Component Value Date    HGBA1C 5.8 (H) 02/14/2019    HGBA1C 6.00 (H) 07/24/2017    HGBA1C 5.5 09/04/2015    GLUCOSE 114 12/16/2019       Lab Results   Component Value Date     (H) 08/26/2019     (H) 04/29/2019     (H) 11/28/2017    HDL 27 (L) 08/26/2019    HDL 38 (L) 04/29/2019    HDL 47 11/28/2017    TRIG 150 08/26/2019    TRIG 131 04/29/2019    TRIG 105 11/28/2017    CHOLHDLRATIO 6.26 08/26/2019    CHOLHDLRATIO 4.71 04/29/2019       Lab Results   Component Value Date    TSH 2.560 11/08/2019    FREET4 1.09 08/30/2017          Lab Results   Component Value Date    WBC 5.84 12/16/2019    HGB 12.2 (L) 12/16/2019    HGB 13.1 11/08/2019    HGB 13.7 09/30/2019     12/16/2019       Lab Results   Component Value Date    PROTEIN Negative 11/28/2017    GLUCOSEU Negative 11/28/2017    BLOODU Negative 11/28/2017    NITRITEU Negative 11/28/2017    LEUKOCYTESUR Negative 11/28/2017       Imaging:        Medical Tests:        Summary of old records / correspondence / consultant report:        Request outside records:        ALLERGIES  No Known Allergies     PFSH:     The following portions of the patient's history were reviewed and updated as appropriate: Allergies / Current Medications / Past Medical History / Surgical History / Social History / Family History    PROBLEM LIST   Patient Active Problem List   Diagnosis   • Impaired fasting glucose   • Hypertension   • Hyperlipidemia   • Impotence of organic origin   • Osteoarthritis of knee   • Tear of medial meniscus of knee   • Obesity (BMI 30-39.9)   • Routine health  maintenance   • Nocturia   • Abnormal laboratory test   • Other sleep apnea   • Lower GI bleed   • Abnormal transaminases   • Rectal bleed   • Fatty liver   • Malignant melanoma (CMS/HCC)   • 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     Employer: [employer]   Tobacco Use   • Smoking status: Former Smoker     Packs/day: 0.50     Years: 23.00     Pack years: 11.50     Types: Cigars     Start date:      Last attempt to quit:      Years since quittin.4   • Smokeless tobacco: Never Used   Substance and Sexual Activity   • Alcohol use: No   • Drug use: No   • Sexual activity: Never   Social History Narrative    Single       FAMILY HISTORY  Family History   Problem Relation Age of Onset   • Hypertension Father    • Hypertension Brother    • Cancer Brother         Liver   • Liver disease Brother    • Cancer Brother         Bladder   • Colon cancer Neg Hx    • Colon polyps Neg Hx

## 2020-06-23 LAB
HBA1C MFR BLD: 5.6 % (ref 4.8–5.6)
PSA SERPL-MCNC: 0.79 NG/ML (ref 0–4)

## 2020-12-01 DIAGNOSIS — I10 ESSENTIAL HYPERTENSION: ICD-10-CM

## 2020-12-01 RX ORDER — VALSARTAN 80 MG/1
TABLET ORAL
Qty: 90 TABLET | Refills: 1 | Status: SHIPPED | OUTPATIENT
Start: 2020-12-01 | End: 2021-04-15 | Stop reason: SDUPTHER

## 2021-03-22 ENCOUNTER — BULK ORDERING (OUTPATIENT)
Dept: CASE MANAGEMENT | Facility: OTHER | Age: 66
End: 2021-03-22

## 2021-03-22 DIAGNOSIS — I10 ESSENTIAL HYPERTENSION: ICD-10-CM

## 2021-03-22 DIAGNOSIS — Z23 IMMUNIZATION DUE: ICD-10-CM

## 2021-03-22 RX ORDER — AMLODIPINE BESYLATE 5 MG/1
TABLET ORAL
Qty: 90 TABLET | Refills: 0 | Status: SHIPPED | OUTPATIENT
Start: 2021-03-22 | End: 2021-03-22 | Stop reason: SDUPTHER

## 2021-03-22 RX ORDER — AMLODIPINE BESYLATE 5 MG/1
5 TABLET ORAL DAILY
Qty: 90 TABLET | Refills: 0 | Status: SHIPPED | OUTPATIENT
Start: 2021-03-22 | End: 2021-04-15 | Stop reason: SDUPTHER

## 2021-04-15 ENCOUNTER — OFFICE VISIT (OUTPATIENT)
Dept: INTERNAL MEDICINE | Age: 66
End: 2021-04-15

## 2021-04-15 VITALS
BODY MASS INDEX: 29.34 KG/M2 | SYSTOLIC BLOOD PRESSURE: 132 MMHG | TEMPERATURE: 97.5 F | DIASTOLIC BLOOD PRESSURE: 80 MMHG | HEIGHT: 75 IN | HEART RATE: 85 BPM | WEIGHT: 236 LBS | OXYGEN SATURATION: 98 %

## 2021-04-15 DIAGNOSIS — C77.9 MALIGNANT MELANOMA METASTATIC TO LYMPH NODE (HCC): ICD-10-CM

## 2021-04-15 DIAGNOSIS — K92.1 BLOOD IN STOOL: ICD-10-CM

## 2021-04-15 DIAGNOSIS — I10 ESSENTIAL HYPERTENSION: ICD-10-CM

## 2021-04-15 DIAGNOSIS — R73.01 IMPAIRED FASTING GLUCOSE: ICD-10-CM

## 2021-04-15 DIAGNOSIS — C43.9 MALIGNANT MELANOMA METASTATIC TO LYMPH NODE (HCC): ICD-10-CM

## 2021-04-15 DIAGNOSIS — E78.5 HYPERLIPIDEMIA, UNSPECIFIED HYPERLIPIDEMIA TYPE: Primary | ICD-10-CM

## 2021-04-15 PROCEDURE — 99214 OFFICE O/P EST MOD 30 MIN: CPT | Performed by: NURSE PRACTITIONER

## 2021-04-15 RX ORDER — AMLODIPINE BESYLATE 5 MG/1
5 TABLET ORAL DAILY
Qty: 90 TABLET | Refills: 1 | Status: SHIPPED | OUTPATIENT
Start: 2021-04-15 | End: 2022-01-11

## 2021-04-15 RX ORDER — VALSARTAN 80 MG/1
80 TABLET ORAL DAILY
Qty: 90 TABLET | Refills: 1 | Status: SHIPPED | OUTPATIENT
Start: 2021-04-15 | End: 2022-02-07

## 2021-04-15 NOTE — PROGRESS NOTES
"Chief Complaint  Hypertension, Hyperlipidemia, and Rectal Bleeding    Subjective          Miles Pierre presents to [facility]  Hypertension  This is a chronic problem. The problem is controlled. Pertinent negatives include no anxiety, blurred vision, chest pain, headaches, peripheral edema, PND, shortness of breath or sweats.   Hyperlipidemia  This is a chronic problem. The problem is uncontrolled. Pertinent negatives include no chest pain or shortness of breath. He is currently on no antihyperlipidemic treatment.   Rectal Bleeding  This is a new problem. Episode onset: 1 month ago. The problem has been waxing and waning. Pertinent negatives include no abdominal pain, change in bowel habit, chest pain, chills or headaches. Associated symptoms comments: Denies any constipation, straining. No hemorrhoids, no pain to rectal area.   No abdominal pain.   Has noted BRB in stool and when wiping.   Last colonoscopy 2019. . Nothing aggravates the symptoms. He has tried nothing for the symptoms.   Last colonoscopy 2019.   Patient recently had right axillary node dissection for metastatic melanoma in December at  by Dr. Tuttle.      Objective   Vital Signs:   /80   Pulse 85   Temp 97.5 °F (36.4 °C) (Temporal)   Ht 190.2 cm (74.88\")   Wt 107 kg (236 lb)   SpO2 98%   BMI 29.59 kg/m²     Physical Exam  Vitals and nursing note reviewed.   Constitutional:       General: He is not in acute distress.     Appearance: He is well-developed. He is not ill-appearing.   Cardiovascular:      Rate and Rhythm: Normal rate and regular rhythm.      Heart sounds: Normal heart sounds, S1 normal and S2 normal. No murmur heard.     Pulmonary:      Effort: Pulmonary effort is normal.      Breath sounds: Normal breath sounds. No decreased breath sounds, wheezing, rhonchi or rales.   Skin:     General: Skin is warm and dry.   Neurological:      Mental Status: He is alert and oriented to person, " place, and time.   Psychiatric:         Speech: Speech normal.         Behavior: Behavior normal. Behavior is cooperative.         Thought Content: Thought content normal.         Judgment: Judgment normal.        Result Review :   The following data was reviewed by: ZOE Hoffman on 04/15/2021:  Common labs    Common Labsle 6/22/20 6/22/20    1534 1534   Hemoglobin A1C  5.60   PSA 0.791       Comments are available for some flowsheets but are not being displayed.           Data reviewed: Consultant notes           Assessment and Plan    Diagnoses and all orders for this visit:    1. Hyperlipidemia, unspecified hyperlipidemia type (Primary)  Will RTO 2 weeks for fasting labs.  Continue to follow and improve on low-fat, low carbohydrate diet.     -     Comprehensive Metabolic Panel; Future  -     Lipid Panel With / Chol / HDL Ratio; Future    2. Essential hypertension  Blood pressure stable on current therapy, continue same.  Check labs today and adjust dosage of medication as necessary.  Follow-up 6 months.    -     valsartan (DIOVAN) 80 MG tablet; Take 1 tablet by mouth Daily.  Dispense: 90 tablet; Refill: 1  -     amLODIPine (NORVASC) 5 MG tablet; Take 1 tablet by mouth Daily.  Dispense: 90 tablet; Refill: 1  -     Comprehensive Metabolic Panel; Future    3. Impaired fasting glucose  -     Comprehensive Metabolic Panel; Future  -     Hemoglobin A1c; Future    4. 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 Blood in stool  Colonoscopy up-to-date.  At this time, patient wants to continue to monitor.  He will return office in approximately 2 weeks for fasting labs.  I have also sent him home today with a occult stool test to bring back.  Discussed with patient if he still having rectal bleeding at that time and/or test is positive, would recommend referral to gastroenterology for further evaluation, especially considering recent cancer history.   Had CT abd/pelvis done last month at .     -      CBC & Differential; Future        Follow Up   Return in about 2 weeks (around 4/29/2021) for Lab appointment (fasting), 6 months follow up  .  Patient was given instructions and counseling regarding his condition or for health maintenance advice. Please see specific information pulled into the AVS if appropriate.

## 2021-05-05 ENCOUNTER — TELEPHONE (OUTPATIENT)
Dept: INTERNAL MEDICINE | Age: 66
End: 2021-05-05

## 2021-05-05 NOTE — TELEPHONE ENCOUNTER
Pt came in and dropped occult stool     The container was almost empty     tied to run test unable . It was too thick , advise

## 2021-05-05 NOTE — TELEPHONE ENCOUNTER
Contact patient.     See if he can repeat test.   Otherwise, if he does not want to do that, we can send him to gastroenterology for further evaluation of his blood in stool.

## 2021-10-15 ENCOUNTER — OFFICE VISIT (OUTPATIENT)
Dept: INTERNAL MEDICINE | Age: 66
End: 2021-10-15

## 2021-10-15 VITALS
HEART RATE: 78 BPM | WEIGHT: 223 LBS | HEIGHT: 75 IN | DIASTOLIC BLOOD PRESSURE: 80 MMHG | OXYGEN SATURATION: 99 % | BODY MASS INDEX: 27.73 KG/M2 | SYSTOLIC BLOOD PRESSURE: 136 MMHG | TEMPERATURE: 97.3 F

## 2021-10-15 DIAGNOSIS — E78.5 HYPERLIPIDEMIA, UNSPECIFIED HYPERLIPIDEMIA TYPE: ICD-10-CM

## 2021-10-15 DIAGNOSIS — R73.01 IMPAIRED FASTING GLUCOSE: ICD-10-CM

## 2021-10-15 DIAGNOSIS — I10 PRIMARY HYPERTENSION: Primary | ICD-10-CM

## 2021-10-15 PROCEDURE — 99214 OFFICE O/P EST MOD 30 MIN: CPT | Performed by: NURSE PRACTITIONER

## 2021-10-15 NOTE — PROGRESS NOTES
"Chief Complaint  Hypertension    Subjective          Miles Pierre presents to [facility]  Hypertension  This is a chronic problem. The problem is unchanged. The problem is controlled. Pertinent negatives include no anxiety, blurred vision, chest pain, headaches, peripheral edema, PND, shortness of breath or sweats. Current antihypertension treatment includes angiotensin blockers and calcium channel blockers. There are no compliance problems (Patient recently retired a few months ago, has had intentional weight loss since then, bicycling on regular basis).  Identifiable causes of hypertension include sleep apnea.   Hyperlipidemia  This is a chronic problem. The problem is controlled. Pertinent negatives include no chest pain or shortness of breath. He is currently on no antihyperlipidemic treatment. There are no compliance problems.        Objective   Vital Signs:   /80   Pulse 78   Temp 97.3 °F (36.3 °C) (Temporal)   Ht 190.2 cm (74.88\")   Wt 101 kg (223 lb)   SpO2 99%   BMI 27.96 kg/m²     Physical Exam  Vitals and nursing note reviewed.   Constitutional:       General: He is not in acute distress.     Appearance: He is well-developed. He is not ill-appearing.   Cardiovascular:      Rate and Rhythm: Normal rate and regular rhythm.      Heart sounds: Normal heart sounds, S1 normal and S2 normal. No murmur heard.      Pulmonary:      Effort: Pulmonary effort is normal.      Breath sounds: Normal breath sounds. No decreased breath sounds, wheezing, rhonchi or rales.   Skin:     General: Skin is warm and dry.   Neurological:      Mental Status: He is alert and oriented to person, place, and time.   Psychiatric:         Speech: Speech normal.         Behavior: Behavior normal. Behavior is cooperative.         Thought Content: Thought content normal.         Judgment: Judgment normal.        Result Review :   The following data was reviewed by: ZOE Hoffman on " 10/15/2021:  Common labs    Common Labsle 4/29/21 4/29/21 4/29/21 4/29/21    0938 0938 0938 0938   Glucose  109 (A)     BUN  19     Creatinine  0.93     eGFR Non  Am  81     eGFR African Am  98     Sodium  140     Potassium  4.7     Chloride  106     Calcium  9.7     Total Protein  7.3     Albumin  4.60     Total Bilirubin  1.9 (A)     Alkaline Phosphatase  86     AST (SGOT)  40     ALT (SGPT)  44 (A)     WBC 6.15      Hemoglobin 14.3      Hematocrit 41.6      Platelets 195      Total Cholesterol   181    Triglycerides   119    HDL Cholesterol   48    LDL Cholesterol    112 (A)    Hemoglobin A1C    5.80 (A)   (A) Abnormal value       Comments are available for some flowsheets but are not being displayed.                  Assessment and Plan    Diagnoses and all orders for this visit:    1. Primary hypertension (Primary)  Blood pressure stable on current therapy, continue same.  Patient having CBC, CMP every 3 weeks through oncology office at ; reviewed most recent labs today in office.   Continue lifestyle modifications for high blood pressure, high cholesterol, and increased weight. Decrease/eliminate soda, caffeine, alcohol and overall caloric intake. Reduce carbohydrates and sweets in diet.  Continue to improve dietary habits with lean proteins, fresh vegetables, fruits, and nuts. Improve aerobic exercise: walking/biking/swimming daily as tolerated, recommend 30 minutes/day at least 5 days/week.     2. Hyperlipidemia, unspecified hyperlipidemia type  Stable on current therapy. Fasting lipid panel UTD and values within acceptable ranges.   Continue to follow and improve on low-fat, low carbohydrate diet.     Lab Results   Component Value Date    CHLPL 181 04/29/2021    TRIG 119 04/29/2021    HDL 48 04/29/2021     (H) 04/29/2021         3. Impaired fasting glucose  Lab Results   Component Value Date    HGBA1C 5.80 (H) 04/29/2021         Follow Up   Return in about 6 months (around 4/15/2022) for  Next scheduled follow up (fasting labs same day) .  Patient was given instructions and counseling regarding his condition or for health maintenance advice. Please see specific information pulled into the AVS if appropriate.

## 2021-11-02 ENCOUNTER — TELEPHONE (OUTPATIENT)
Dept: INTERNAL MEDICINE | Age: 66
End: 2021-11-02

## 2021-11-02 DIAGNOSIS — Z12.5 SPECIAL SCREENING FOR MALIGNANT NEOPLASM OF PROSTATE: ICD-10-CM

## 2021-11-02 DIAGNOSIS — E78.5 HYPERLIPIDEMIA, UNSPECIFIED HYPERLIPIDEMIA TYPE: Primary | ICD-10-CM

## 2021-11-02 DIAGNOSIS — R73.01 IMPAIRED FASTING GLUCOSE: ICD-10-CM

## 2021-11-02 NOTE — TELEPHONE ENCOUNTER
Yes, I am unsure if maybe I did not document this or if he is mistaken.   He does have labs done through oncology pretty frequently.     Is there anything in particular he is wanting checked? Otherwise, everything looks good to me and we can do labs as scheduled with next follow up in April.

## 2021-11-02 NOTE — TELEPHONE ENCOUNTER
No he was not asking for labs to be done. He said he left a voice mail for us yesterday in regards to labs but he doesn't know what labs. And I explained we do not have voice mail. But he was insistent.

## 2021-11-02 NOTE — TELEPHONE ENCOUNTER
Patient called requesting only a lab appointment. He states he was told by Rowena that he needed to come in just for labs.   I see no documentation of this or any awaiting labs needing to be drawn. Just that patient should return in 6 mths for an appt w/ fasting labs.     PLEASE ADVISE

## 2021-11-03 DIAGNOSIS — E78.5 HYPERLIPIDEMIA, UNSPECIFIED HYPERLIPIDEMIA TYPE: ICD-10-CM

## 2021-11-03 DIAGNOSIS — R73.01 IMPAIRED FASTING GLUCOSE: ICD-10-CM

## 2021-11-03 DIAGNOSIS — Z12.5 SPECIAL SCREENING FOR MALIGNANT NEOPLASM OF PROSTATE: ICD-10-CM

## 2021-11-04 ENCOUNTER — TELEPHONE (OUTPATIENT)
Dept: INTERNAL MEDICINE | Age: 66
End: 2021-11-04

## 2021-11-04 DIAGNOSIS — C77.9 MALIGNANT MELANOMA METASTATIC TO LYMPH NODE (HCC): ICD-10-CM

## 2021-11-04 DIAGNOSIS — R19.5 POSITIVE OCCULT STOOL BLOOD TEST: ICD-10-CM

## 2021-11-04 DIAGNOSIS — K62.5 RECTAL BLEED: Primary | ICD-10-CM

## 2021-11-04 DIAGNOSIS — C43.9 MALIGNANT MELANOMA METASTATIC TO LYMPH NODE (HCC): ICD-10-CM

## 2021-11-04 LAB
DEVELOPER EXPIRATION DATE: ABNORMAL
DEVELOPER LOT NUMBER: ABNORMAL
EXPIRATION DATE: ABNORMAL
FECAL OCCULT BLOOD SCREEN, POC: POSITIVE
Lab: ABNORMAL
NEGATIVE CONTROL: NEGATIVE
POSITIVE CONTROL: POSITIVE

## 2021-11-04 PROCEDURE — 82270 OCCULT BLOOD FECES: CPT | Performed by: NURSE PRACTITIONER

## 2021-11-04 NOTE — TELEPHONE ENCOUNTER
----- Message from Liss Porras MA sent at 11/4/2021  1:45 PM EDT -----  Pt informed of Lab Results and would like the referral to gastro.

## 2021-11-05 LAB
CHOLEST SERPL-MCNC: 185 MG/DL (ref 100–199)
CHOLEST/HDLC SERPL: 3.4 RATIO (ref 0–5)
HBA1C MFR BLD: 6 % (ref 4.8–5.6)
HDLC SERPL-MCNC: 55 MG/DL
LDLC SERPL CALC-MCNC: 113 MG/DL (ref 0–99)
PSA SERPL-MCNC: 1.2 NG/ML (ref 0–4)
TRIGL SERPL-MCNC: 94 MG/DL (ref 0–149)
VLDLC SERPL CALC-MCNC: 17 MG/DL (ref 5–40)

## 2021-12-09 ENCOUNTER — OFFICE VISIT (OUTPATIENT)
Dept: GASTROENTEROLOGY | Facility: CLINIC | Age: 66
End: 2021-12-09

## 2021-12-09 ENCOUNTER — TELEPHONE (OUTPATIENT)
Dept: GASTROENTEROLOGY | Facility: CLINIC | Age: 66
End: 2021-12-09

## 2021-12-09 VITALS — WEIGHT: 226.8 LBS | BODY MASS INDEX: 28.2 KG/M2 | TEMPERATURE: 96.9 F | HEIGHT: 75 IN

## 2021-12-09 DIAGNOSIS — K62.5 RECTAL BLEEDING: Primary | ICD-10-CM

## 2021-12-09 DIAGNOSIS — D12.6 ADENOMATOUS POLYP OF COLON, UNSPECIFIED PART OF COLON: ICD-10-CM

## 2021-12-09 DIAGNOSIS — R19.5 HEME POSITIVE STOOL: ICD-10-CM

## 2021-12-09 PROCEDURE — 99214 OFFICE O/P EST MOD 30 MIN: CPT | Performed by: INTERNAL MEDICINE

## 2021-12-09 NOTE — PROGRESS NOTES
Chief Complaint   Patient presents with   • Cirrhosis   • Black or Bloody Stool        Miles Pierre is a  65 y.o. male here for an initial visit for rectal bleeding, heme positive stool, history of polyps.    HPI this 65-year-old white male patient of Rowena ENRIQUEZ had recent issues with rectal bleeding that he attributes to using a bike for exercise and local trauma associated with a small seat.  He does have a history of hemorrhoids which was defined by colonoscopy in 2019 by Dr. Sunil Farr.  Dr. Farr had also removed 2 polyps at that time one was tubular adenoma the other was a serrated adenoma.  He denies any bleeding subsequent to that event but did test Hemoccult positive.  He has an underlying history of malignant melanoma and is followed closely by the oncology service.  Some reference was made to possible cirrhosis but he does not recall this, however, he does mention mild transaminase elevation and steatosis of the liver.    Past Medical History:   Diagnosis Date   • Anxiety    • Cirrhosis (HCC)    • Depression    • ED (erectile dysfunction)    • Hyperlipidemia    • Hypertension    • Metastatic melanoma (HCC)    • Osteoarthritis     Knee   • Skin cancer 05/2017    Basal cell carcinoma       Current Outpatient Medications   Medication Sig Dispense Refill   • amLODIPine (NORVASC) 5 MG tablet Take 1 tablet by mouth Daily. 90 tablet 1   • hydrOXYzine (ATARAX) 25 MG tablet Take 25 mg by mouth 3 (Three) Times a Day As Needed for Itching.     • Pembrolizumab (KEYTRUDA IV) Infuse  into a venous catheter. Every 3 weeks     • valsartan (DIOVAN) 80 MG tablet Take 1 tablet by mouth Daily. 90 tablet 1     No current facility-administered medications for this visit.       PRN Meds:.    No Known Allergies    Social History     Socioeconomic History   • Marital status: Single   • Number of children: 3   • Years of education: High school   Tobacco Use   • Smoking status: Current Some Day Smoker      Packs/day: 0.50     Years: 23.00     Pack years: 11.50     Types: Cigars     Start date:      Last attempt to quit:      Years since quittin.9   • Smokeless tobacco: Never Used   Substance and Sexual Activity   • Alcohol use: Yes     Comment: seldom   • Drug use: No   • Sexual activity: Never       Family History   Problem Relation Age of Onset   • Hypertension Father    • Hypertension Brother    • Cancer Brother         Liver   • Liver disease Brother    • Cancer Brother         Bladder   • Colon cancer Neg Hx    • Colon polyps Neg Hx        Review of Systems   Constitutional: Negative for activity change, appetite change, fatigue and unexpected weight change.   HENT: Negative for congestion, facial swelling, sore throat, trouble swallowing and voice change.    Eyes: Negative for photophobia and visual disturbance.   Respiratory: Negative for cough and choking.    Cardiovascular: Negative for chest pain.   Gastrointestinal: Positive for anal bleeding. Negative for abdominal distention, abdominal pain, blood in stool, constipation, diarrhea, nausea, rectal pain and vomiting.   Endocrine: Negative for polyphagia.   Musculoskeletal: Negative for arthralgias, gait problem and joint swelling.   Skin: Negative for color change, pallor and rash.   Allergic/Immunologic: Negative for food allergies.   Neurological: Negative for speech difficulty and headaches.   Hematological: Does not bruise/bleed easily.   Psychiatric/Behavioral: Negative for agitation, confusion and sleep disturbance.       Vitals:    21 1252   Temp: 96.9 °F (36.1 °C)       Physical Exam  Vitals reviewed.   Constitutional:       General: He is not in acute distress.     Appearance: He is well-developed. He is not diaphoretic.   HENT:      Head: Normocephalic.      Mouth/Throat:      Pharynx: No oropharyngeal exudate.   Eyes:      General: No scleral icterus.     Conjunctiva/sclera: Conjunctivae normal.   Neck:      Thyroid: No  thyromegaly.   Cardiovascular:      Rate and Rhythm: Normal rate and regular rhythm.      Heart sounds: No murmur heard.      Pulmonary:      Effort: No respiratory distress.      Breath sounds: Normal breath sounds. No wheezing or rales.   Abdominal:      General: Bowel sounds are normal. There is no distension.      Palpations: Abdomen is soft. There is no mass.      Tenderness: There is no abdominal tenderness.   Musculoskeletal:         General: No tenderness. Normal range of motion.      Cervical back: Normal range of motion.   Lymphadenopathy:      Cervical: No cervical adenopathy.   Skin:     General: Skin is warm and dry.      Findings: No erythema or rash.   Neurological:      Mental Status: He is alert and oriented to person, place, and time.   Psychiatric:         Behavior: Behavior normal.         ASSESSMENT   #1 rectal bleeding: Suspect this was associated with local trauma, cannot rule out other source.  #2 heme positive stool  #3 history of polyps      PLAN  Schedule colonoscopy  Review labs and CT records from oncology service      ICD-10-CM ICD-9-CM   1. Rectal bleeding  K62.5 569.3   2. Adenomatous polyp of colon, unspecified part of colon  D12.6 211.3

## 2021-12-09 NOTE — TELEPHONE ENCOUNTER
----- Message from Torey GRIGGS MD sent at 12/9/2021  1:21 PM EST -----  Please request recent labs and CT results from oncology service at [facility]

## 2022-01-11 DIAGNOSIS — I10 ESSENTIAL HYPERTENSION: ICD-10-CM

## 2022-01-11 RX ORDER — AMLODIPINE BESYLATE 5 MG/1
TABLET ORAL
Qty: 90 TABLET | Refills: 0 | Status: SHIPPED | OUTPATIENT
Start: 2022-01-11 | End: 2022-04-18 | Stop reason: SDUPTHER

## 2022-02-06 DIAGNOSIS — I10 ESSENTIAL HYPERTENSION: ICD-10-CM

## 2022-02-07 RX ORDER — VALSARTAN 80 MG/1
TABLET ORAL
Qty: 90 TABLET | Refills: 1 | Status: SHIPPED | OUTPATIENT
Start: 2022-02-07 | End: 2022-08-04 | Stop reason: SDUPTHER

## 2022-04-18 DIAGNOSIS — I10 ESSENTIAL HYPERTENSION: ICD-10-CM

## 2022-04-18 RX ORDER — AMLODIPINE BESYLATE 5 MG/1
5 TABLET ORAL DAILY
Qty: 90 TABLET | Refills: 0 | Status: SHIPPED | OUTPATIENT
Start: 2022-04-18 | End: 2022-07-25

## 2022-04-18 NOTE — TELEPHONE ENCOUNTER
Caller: Miles Pierre    Relationship: Self    Best call back number: [phone]     Requested Prescriptions:   Requested Prescriptions     Pending Prescriptions Disp Refills   • amLODIPine (NORVASC) 5 MG tablet 90 tablet 0     Sig: Take 1 tablet by mouth Daily.        [facility] [address] AT [facility] - [phone] [facility] [phone] FX     Does the patient have less than a 3 day supply:  [x] Yes  [] No    Rosalie Jason Rep   04/18/22 09:18 EDT

## 2022-04-20 PROBLEM — C77.9 MALIGNANT NEOPLASM METASTATIC TO LYMPH NODES: Status: ACTIVE | Noted: 2022-04-20

## 2022-07-23 DIAGNOSIS — I10 ESSENTIAL HYPERTENSION: ICD-10-CM

## 2022-07-25 RX ORDER — AMLODIPINE BESYLATE 5 MG/1
TABLET ORAL
Qty: 30 TABLET | Refills: 0 | Status: SHIPPED | OUTPATIENT
Start: 2022-07-25 | End: 2022-08-04 | Stop reason: SDUPTHER

## 2022-08-04 ENCOUNTER — OFFICE VISIT (OUTPATIENT)
Dept: INTERNAL MEDICINE | Age: 67
End: 2022-08-04

## 2022-08-04 VITALS
SYSTOLIC BLOOD PRESSURE: 126 MMHG | HEART RATE: 72 BPM | BODY MASS INDEX: 43.27 KG/M2 | HEIGHT: 60 IN | TEMPERATURE: 97.3 F | WEIGHT: 220.4 LBS | DIASTOLIC BLOOD PRESSURE: 72 MMHG | OXYGEN SATURATION: 96 %

## 2022-08-04 DIAGNOSIS — C43.9 MALIGNANT MELANOMA, UNSPECIFIED SITE: ICD-10-CM

## 2022-08-04 DIAGNOSIS — E78.5 HYPERLIPIDEMIA, UNSPECIFIED HYPERLIPIDEMIA TYPE: ICD-10-CM

## 2022-08-04 DIAGNOSIS — I10 ESSENTIAL HYPERTENSION: Primary | ICD-10-CM

## 2022-08-04 PROCEDURE — 99214 OFFICE O/P EST MOD 30 MIN: CPT | Performed by: NURSE PRACTITIONER

## 2022-08-04 RX ORDER — VALSARTAN 80 MG/1
80 TABLET ORAL DAILY
Qty: 90 TABLET | Refills: 0 | Status: SHIPPED | OUTPATIENT
Start: 2022-08-04 | End: 2022-11-07

## 2022-08-04 RX ORDER — AMLODIPINE BESYLATE 5 MG/1
5 TABLET ORAL DAILY
Qty: 90 TABLET | Refills: 0 | Status: SHIPPED | OUTPATIENT
Start: 2022-08-04 | End: 2022-11-02

## 2022-08-04 NOTE — PROGRESS NOTES
I N T E R N A L  M E D I C I N E  ZOE Jhaveri    ENCOUNTER DATE:  08/04/2022    Miles Pierre / 66 y.o. / male      CHIEF COMPLAINT / REASON FOR OFFICE VISIT     Establish Care with new PCP, former patient of Rowena Motley APRSUZI, Hyperlipidemia, Hypertension, and Med Refill (Blood pressure meds)      ASSESSMENT & PLAN     Diagnoses and all orders for this visit:    1. Essential hypertension (Primary)       - Continue Amlodipine 5 mg 1 tablet daily       - Continue Valsartan 80 mg daily       - Continue lifestyle modifications for high blood pressure, high cholesterol, and increased weight. Decrease/eliminate soda, caffeine, alcohol and overall caloric intake. Reduce carbohydrates and sweets in diet.  Continue to improve dietary habits with lean proteins, fresh vegetables, fruits, and nuts. Improve aerobic exercise: walking daily as tolerated, recommend 30 minutes/day at least        2. Hyperlipidemia, unspecified hyperlipidemia type       - currently not on a statin. Recent Lipid panel with LDL of 113.     3. Malignant melanoma, unspecified site (HCC)       - Followed by [facility] and labs to include CBC CMP  every 3 weeks- reviewed with patient        - Continue Hydroxyzine 25 mg as needed       - Continue Triamcinolone 0.5% topical ointment as directed     SUMMARY/DISCUSSION  • Discussion of Smoking Cessation and verbalized understanding  • Follow up with Hematology/Oncology as scheduled   • I spent 23 min in direct care of this patient on this date of service. This time includes times spent by me in the following activities: Preparing for the visit, obtaining and/or reviewing a separately obtained history, performing a medically appropriate examination and/or evaluation, reviewing medical records, reviewing tests, ordering medications, tests, or procedures, counseling and educating the patient, documenting information in the medical record and reviewing office note/correspondence from  "other providers.     Return in about 3 months (around 11/4/2022) for Next scheduled follow up.      VITAL SIGNS     Visit Vitals  /72 (BP Location: Left arm)   Pulse 72   Temp 97.3 °F (36.3 °C) (Temporal)   Ht 74.9 cm (29.48\")   Wt 100 kg (220 lb 6.4 oz)   SpO2 96%   .30 kg/m²           BP Readings from Last 3 Encounters:   08/04/22 126/72   10/15/21 136/80   04/15/21 132/80     Wt Readings from Last 3 Encounters:   08/04/22 100 kg (220 lb 6.4 oz)   12/09/21 103 kg (226 lb 12.8 oz)   10/15/21 101 kg (223 lb)     Body mass index is 178.3 kg/m².    Blood pressure readings recorded on patient flowsheet:  No flowsheet data found.          MEDICATIONS AT THE TIME OF OFFICE VISIT     Current Outpatient Medications on File Prior to Visit   Medication Sig Dispense Refill   • hydrOXYzine (ATARAX) 25 MG tablet Take 25 mg by mouth 3 (Three) Times a Day As Needed for Itching.     • Pembrolizumab (KEYTRUDA IV) Infuse  into a venous catheter. Every 3 weeks     • [DISCONTINUED] amLODIPine (NORVASC) 5 MG tablet TAKE ONE TABLET BY MOUTH DAILY 30 tablet 0   • [DISCONTINUED] valsartan (DIOVAN) 80 MG tablet TAKE ONE TABLET BY MOUTH DAILY 90 tablet 1     No current facility-administered medications on file prior to visit.        HISTORY OF PRESENT ILLNESS     66 year old male being seen today to establish care with new PCP, former patient of ZOE Juárez. States continues to get treatment at [facility] for cancer and recent labs reviewed.   Hypertension controlled. Continue current medication regime.    Hyperlipidemia:  Discussion of diet, exercise, high protein foods for energy, po fluids- states does not drink enough water. Medications refilled.     Patient Care Team:  Estee Weinstein APRN as PCP - General (Family Medicine)  Rowena Motley APRN as Referring Physician (Internal Medicine)  Gillian Moran MD as Consulting Physician (Hematology and Oncology)  Corin Tuttle MD (Surgical " Oncology)    REVIEW OF SYSTEMS     Review of Systems   Constitutional: Negative.    HENT: Negative.    Eyes: Negative.         Wears glasses   Respiratory: Negative.    Cardiovascular: Negative.    Gastrointestinal: Negative.    Musculoskeletal: Negative.    Skin: Negative.    Neurological: Negative.    Psychiatric/Behavioral: Negative.           PHYSICAL EXAMINATION     Physical Exam  Cardiovascular:      Rate and Rhythm: Normal rate and regular rhythm.      Pulses: Normal pulses.      Heart sounds: Normal heart sounds.   Pulmonary:      Effort: Pulmonary effort is normal.      Breath sounds: Normal breath sounds.   Musculoskeletal:         General: Normal range of motion.   Skin:     General: Skin is warm and dry.      Comments: Hyperpigmentation from skin cancer bilateral arms   Neurological:      Mental Status: He is alert.             REVIEWED DATA     Labs:     Lab Results   Component Value Date     04/29/2021    K 4.7 04/29/2021    CALCIUM 9.7 04/29/2021    AST 40 04/29/2021    ALT 44 (H) 04/29/2021    BUN 19 04/29/2021    CREATININE 0.93 04/29/2021    CREATININE 0.60 01/13/2020    CREATININE 0.66 (L) 12/16/2019    EGFRIFNONA 81 04/29/2021    EGFRIFAFRI 98 04/29/2021       Lab Results   Component Value Date    HGBA1C 6.0 (H) 11/04/2021    HGBA1C 5.80 (H) 04/29/2021    HGBA1C 5.60 06/22/2020       Lab Results   Component Value Date     (H) 11/04/2021     (H) 04/29/2021     (H) 08/26/2019    HDL 55 11/04/2021    HDL 48 04/29/2021    TRIG 94 11/04/2021    TRIG 119 04/29/2021       Lab Results   Component Value Date    TSH 2.560 11/08/2019    TSH 1.240 08/30/2017    FREET4 0.90 07/19/2022    FREET4 0.72 06/28/2022    FREET4 0.71 06/07/2022       Lab Results   Component Value Date    WBC 6.15 04/29/2021    HGB 14.3 04/29/2021     04/29/2021       No results found for: MALBCRERATIO       Imaging:           Medical Tests:           Summary of old records / correspondence /  consultant report:           Request outside records:           Estee Weinstein, APRN

## 2022-08-22 NOTE — TELEPHONE ENCOUNTER
Pt is taking Amlodipine and doesn't seen to help his B/P that much, makes him feels BLAH and he has lost 7 lbs since the last visit and has not been trying to loose weight. Pt does not have an appt with Rowena until November and wants to know what he should do for the time being?    Samples Given: Mupirocin ointment Detail Level: Simple

## 2022-11-07 DIAGNOSIS — I10 ESSENTIAL HYPERTENSION: ICD-10-CM

## 2022-11-07 RX ORDER — VALSARTAN 80 MG/1
TABLET ORAL
Qty: 90 TABLET | Refills: 0 | Status: SHIPPED | OUTPATIENT
Start: 2022-11-07 | End: 2023-02-13 | Stop reason: SDUPTHER

## 2022-11-07 NOTE — TELEPHONE ENCOUNTER
Rx Refill Note  Requested Prescriptions     Pending Prescriptions Disp Refills   • valsartan (DIOVAN) 80 MG tablet [Pharmacy Med Name: VALSARTAN 80 MG TABLET] 90 tablet 0     Sig: TAKE ONE TABLET BY MOUTH DAILY      Last office visit with prescribing clinician: 8/4/2022      Next office visit with prescribing clinician: 11/17/2022            Jhoana Gutierrez, PCT  11/07/22, 09:08 EST

## 2022-11-17 ENCOUNTER — OFFICE VISIT (OUTPATIENT)
Dept: INTERNAL MEDICINE | Age: 67
End: 2022-11-17

## 2022-11-17 VITALS
HEIGHT: 75 IN | SYSTOLIC BLOOD PRESSURE: 144 MMHG | OXYGEN SATURATION: 96 % | WEIGHT: 230.8 LBS | HEART RATE: 72 BPM | BODY MASS INDEX: 28.7 KG/M2 | DIASTOLIC BLOOD PRESSURE: 84 MMHG | TEMPERATURE: 97.7 F

## 2022-11-17 DIAGNOSIS — I10 ESSENTIAL HYPERTENSION: Primary | ICD-10-CM

## 2022-11-17 DIAGNOSIS — E78.5 HYPERLIPIDEMIA, UNSPECIFIED HYPERLIPIDEMIA TYPE: ICD-10-CM

## 2022-11-17 DIAGNOSIS — C80.1: ICD-10-CM

## 2022-11-17 DIAGNOSIS — C79.89: ICD-10-CM

## 2022-11-17 PROCEDURE — 99213 OFFICE O/P EST LOW 20 MIN: CPT | Performed by: NURSE PRACTITIONER

## 2022-11-17 RX ORDER — AMLODIPINE BESYLATE 5 MG/1
5 TABLET ORAL DAILY
Qty: 90 TABLET | Refills: 0 | Status: SHIPPED | OUTPATIENT
Start: 2022-11-17 | End: 2023-03-01

## 2022-11-17 RX ORDER — AMLODIPINE BESYLATE 5 MG/1
5 TABLET ORAL DAILY
COMMUNITY
End: 2022-11-17 | Stop reason: SDUPTHER

## 2022-11-17 NOTE — ASSESSMENT & PLAN NOTE
Hypertension is improving with treatment. Continue Amlodipine 5 mg daily and Valsartan 80 mg daily   Continue current treatment regimen.  Dietary sodium restriction.  Weight loss.  Regular aerobic exercise.  Blood pressure will be reassessed at the next regular appointment.

## 2022-11-17 NOTE — PROGRESS NOTES
I N T E R N A L  M E D I C I N E  Estee Weinstein, ZOE    ENCOUNTER DATE:  11/17/2022    Miles Pierre / 66 y.o. / male      CHIEF COMPLAINT / REASON FOR OFFICE VISIT     Follow-up, Hyperlipidemia, and Hypertension      ASSESSMENT & PLAN     Diagnoses and all orders for this visit:    1. Essential hypertension (Primary)  Assessment & Plan:  Hypertension is improving with treatment. Continue Amlodipine 5 mg daily and Valsartan 80 mg daily   Continue current treatment regimen.  Dietary sodium restriction.  Weight loss.  Regular aerobic exercise.  Blood pressure will be reassessed at the next regular appointment.      2. Hyperlipidemia, unspecified hyperlipidemia type  Assessment & Plan:  Lipid abnormalities are improving with lifestyle modifications.  Nutritional counseling was provided.  Lipids will be reassessed in 6 months.      3. 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 Continues on Keytruda as scheduled    Other orders  -     amLODIPine (NORVASC) 5 MG tablet; Take 1 tablet by mouth Daily for 90 days.  Dispense: 90 tablet; Refill: 0       SUMMARY/DISCUSSION  • Scheduled for Medicare Annual Wellness visit   • I spent 25 min in direct care of this patient on this date of service. This time includes times spent by me in the following activities: Preparing for the visit, obtaining and/or reviewing a separately obtained history, performing a medically appropriate examination and/or evaluation, reviewing medical records, reviewing tests, ordering medications, tests, or procedures, counseling and educating the patient, documenting information in the medical record and reviewing office note/correspondence from other providers.     Return in about 6 weeks (around 12/29/2022) for Medicare Wellness.      VITAL SIGNS     Visit Vitals  /84 (BP Location: Left arm, Patient  "Position: Sitting, Cuff Size: Adult)   Pulse 72   Temp 97.7 °F (36.5 °C) (Temporal)   Ht 190.5 cm (75\")   Wt 105 kg (230 lb 12.8 oz)   SpO2 96%   BMI 28.85 kg/m²           BP Readings from Last 3 Encounters:   11/17/22 144/84   08/04/22 126/72   10/15/21 136/80     Wt Readings from Last 3 Encounters:   11/17/22 105 kg (230 lb 12.8 oz)   08/04/22 100 kg (220 lb 6.4 oz)   12/09/21 103 kg (226 lb 12.8 oz)     Body mass index is 28.85 kg/m².    Blood pressure readings recorded on patient flowsheet:  No flowsheet data found.          MEDICATIONS AT THE TIME OF OFFICE VISIT     Current Outpatient Medications on File Prior to Visit   Medication Sig Dispense Refill   • hydrOXYzine (ATARAX) 25 MG tablet Take 25 mg by mouth 3 (Three) Times a Day As Needed for Itching.     • Pembrolizumab (KEYTRUDA IV) Infuse  into a venous catheter. Every 3 weeks     • valsartan (DIOVAN) 80 MG tablet TAKE ONE TABLET BY MOUTH DAILY 90 tablet 0   • [DISCONTINUED] amLODIPine (NORVASC) 5 MG tablet Take 5 mg by mouth Daily.       No current facility-administered medications on file prior to visit.        HISTORY OF PRESENT ILLNESS     66 year old male being seen today for follow up visit. States continues with IV chemo but most recent PET scan shows that he is cancer free.   Hypertension controlled with current medications. States continues to follow a healthy diet. States having some mild discomfort in his wrists, taking Acetaminophen as needed with relief.     Patient Care Team:  Estee Weinstein APRN as PCP - General (Family Medicine)  Rowena Motley APRN as Referring Physician (Internal Medicine)  Gillian Moran MD as Consulting Physician (Hematology and Oncology)  Corin Tuttle MD (Surgical Oncology)    REVIEW OF SYSTEMS     Review of Systems   Constitutional: Negative.    HENT: Negative.    Eyes: Negative.         Wears glasses   Respiratory: Negative.    Cardiovascular: Negative.    Gastrointestinal: Negative.  "   Musculoskeletal: Positive for arthralgias and myalgias.   Skin: Negative.    Neurological: Negative.    Psychiatric/Behavioral: Negative.           PHYSICAL EXAMINATION     Physical Exam  Cardiovascular:      Rate and Rhythm: Normal rate and regular rhythm.      Pulses: Normal pulses.      Heart sounds: Normal heart sounds.   Pulmonary:      Effort: Pulmonary effort is normal.      Breath sounds: Normal breath sounds.   Abdominal:      General: Bowel sounds are normal.      Palpations: Abdomen is soft.   Musculoskeletal:         General: Tenderness present. Normal range of motion.      Comments: States arthritis pain in his wrists and taking Acetaminophen as needed   Skin:     General: Skin is warm and dry.   Neurological:      Mental Status: He is alert and oriented to person, place, and time.             REVIEWED DATA     Labs:     Lab Results   Component Value Date     04/29/2021    K 4.7 04/29/2021    CALCIUM 9.7 04/29/2021    AST 40 04/29/2021    ALT 44 (H) 04/29/2021    BUN 19 04/29/2021    CREATININE 0.93 04/29/2021    CREATININE 0.60 01/13/2020    CREATININE 0.66 (L) 12/16/2019    EGFRIFNONA 81 04/29/2021    EGFRIFAFRI 98 04/29/2021       Lab Results   Component Value Date    HGBA1C 6.0 (H) 11/04/2021    HGBA1C 5.80 (H) 04/29/2021    HGBA1C 5.60 06/22/2020       Lab Results   Component Value Date     (H) 11/04/2021     (H) 04/29/2021     (H) 08/26/2019    HDL 55 11/04/2021    HDL 48 04/29/2021    TRIG 94 11/04/2021    TRIG 119 04/29/2021       Lab Results   Component Value Date    TSH 2.560 11/08/2019    TSH 1.240 08/30/2017    FREET4 0.64 11/08/2022    FREET4 0.74 10/18/2022    FREET4 0.69 09/20/2022       Lab Results   Component Value Date    WBC 6.15 04/29/2021    HGB 14.3 04/29/2021     04/29/2021       No results found for: MALBCRERATIO       Imaging:           Medical Tests:           Summary of old records / correspondence / consultant report:           Request  outside records:           ZOE Jhaveri

## 2022-11-26 ENCOUNTER — DOCUMENTATION (OUTPATIENT)
Dept: FAMILY MEDICINE CLINIC | Facility: CLINIC | Age: 67
End: 2022-11-26

## 2022-11-26 NOTE — PROGRESS NOTES
11/26/2022 11:23 AM    Patient called from [place] stating he had flu symptoms.  Patient wanted to call in Tamiflu.  Patient was told it was on backorder and had to use over-the-counter sinus medications.  Patient did not get the flu shot.

## 2022-11-28 ENCOUNTER — TELEPHONE (OUTPATIENT)
Dept: INTERNAL MEDICINE | Age: 67
End: 2022-11-28

## 2022-11-28 ENCOUNTER — TELEMEDICINE (OUTPATIENT)
Dept: INTERNAL MEDICINE | Age: 67
End: 2022-11-28

## 2022-11-28 VITALS — WEIGHT: 230 LBS | BODY MASS INDEX: 28.6 KG/M2 | HEIGHT: 75 IN

## 2022-11-28 DIAGNOSIS — U07.1 POSITIVE SELF-ADMINISTERED ANTIGEN TEST FOR COVID-19: Primary | ICD-10-CM

## 2022-11-28 PROCEDURE — 99213 OFFICE O/P EST LOW 20 MIN: CPT | Performed by: NURSE PRACTITIONER

## 2022-11-28 RX ORDER — HYDROXYZINE HYDROCHLORIDE 25 MG/1
TABLET, FILM COATED ORAL
COMMUNITY
Start: 2022-07-26 | End: 2022-12-29 | Stop reason: SDUPTHER

## 2022-11-28 NOTE — PROGRESS NOTES
I N T E R N A L  M E D I C I N E  Estee WeinsteinZOE       ENCOUNTER DATE:  11/28/2022    Miles Pierre / 66 y.o. / male        You have chosen to receive care through a telehealth visit.  Do you consent to use a video/audio connection for your medical care today? Yes  Location of patient is in [place]   Location of Provider is in [facility] [place]      CHIEF COMPLAINT / REASON FOR OFFICE VISIT     TELEHEALTH ENCOUNTER:  covid positive , Sore Throat, Cough, Generalized Body Aches, and URI      ASSESSMENT & PLAN     1. Positive self-administered antigen test for COVID-19       Discussion of COVID 19 and treatment options including over the counter medications or Antimicrobial Paxlovid. Patient states would like to try the Paxlovid- instructed on purpose, administration, side effects.    New Medications Ordered This Visit   Medications   • Nirmatrelvir&Ritonavir 300/100 (PAXLOVID) 20 x 150 MG & 10 x 100MG tablet therapy pack tablet     Sig: Take 3 tablets by mouth 2 (Two) Times a Day for 5 days.     Dispense:  30 tablet     Refill:  0     DO NOT use in patients < 12 years or in patients with eGFR < 30. Dose adjustment to 2 tablets BID required for patients with eGFR >/= 30 - < 60.     Order Specific Question:   Verbal consent obtained. Patient informed of alternative therapies available and that this is a drug authorized for use under EUA.     Answer:   Yes     Order Specific Question:   I have reviewed the patient's medication list prior to prescribing Paxlovid due to the risk of serious adverse reactions secondary to drug interactions. I will consult with pharmacy, if needed     Answer:   Patient's medication list reviewed     Order Specific Question:   I confirm this patient is >/= 12 years of age AND weighs >/= 40 kg and understand this is required for prescribing under the PAXLOVID emergency use authorization.     Answer:   I have confirmed patient age and weight meet EUA criteria     Order  Specific Question:   Please select this patient's eGFR     Answer:   eGFR >/= 60     Order Specific Question:   Does the patient require hospitalization due to severe or critical COVID-19?     Answer:   No     Order Specific Question:   Does the patient have a confirmed COVID-19 infection, are within 5 days of symptom onset, AND are at high risk for progression to severe COVID-19, including hospitalization or death?     Answer:   Yes     Order Specific Question:   Patient must be considered high risk for progressing to severe COVID-19 and/or hospitalization per the EUA. Please select all of the following high risk criteria that apply. (SEE LINK ABOVE for list of immunocompromising conditions)     Answer:   Age >/= 65 years       SUMMARY/DISCUSSION  • Discussion of self isolation for 5 days from testing positive. Instructed Tylenol as needed for fever/pain, MVI with Zinc daily for fatigue, chloraseptic spray for sore throat.  • Instructed patient to go to the ER for acute chest pain, palpations, or increased shortness of breath.      Time spent: 20 minutes    Next Appointment with me: 12/29/2022    No follow-ups on file.        HISTORY OF PRESENT ILLNESS     Tested positive for COVID-19 yesterday. States having Sore Throat, Cough, Generalized Body Aches, and URI. States has not taken any medications at this time.           REVIEWED DATA     Labs:           Imaging:           Medical Tests:           Summary of old records / correspondence / consultant report:           Request outside records:         ZOE Jhaveri

## 2022-11-28 NOTE — TELEPHONE ENCOUNTER
Caller: Miles Pierre    Relationship to patient: Self    Best call back number: [phone]     Date of positive COVID19 test: 11/28    COVID19 symptoms: COUGH,CONGESTION,FEVER,CHILLS     Additional information or concerns: PATIENT IS WANTING AN ANTIBIOTIC CALLED IN IF POSSIBLE.     What is the patients preferred pharmacy: [id number] [address] [phone] [phone] FX

## 2022-12-29 ENCOUNTER — OFFICE VISIT (OUTPATIENT)
Dept: INTERNAL MEDICINE | Age: 67
End: 2022-12-29

## 2022-12-29 VITALS
HEART RATE: 69 BPM | HEIGHT: 75 IN | BODY MASS INDEX: 28.47 KG/M2 | OXYGEN SATURATION: 97 % | TEMPERATURE: 98.2 F | SYSTOLIC BLOOD PRESSURE: 124 MMHG | WEIGHT: 229 LBS | DIASTOLIC BLOOD PRESSURE: 72 MMHG

## 2022-12-29 DIAGNOSIS — K76.0 FATTY LIVER: ICD-10-CM

## 2022-12-29 DIAGNOSIS — I10 ESSENTIAL HYPERTENSION: Primary | ICD-10-CM

## 2022-12-29 DIAGNOSIS — E78.5 HYPERLIPIDEMIA, UNSPECIFIED HYPERLIPIDEMIA TYPE: ICD-10-CM

## 2022-12-29 DIAGNOSIS — Z13.9 SCREENING DUE: ICD-10-CM

## 2022-12-29 DIAGNOSIS — M17.0 PRIMARY OSTEOARTHRITIS OF BOTH KNEES: ICD-10-CM

## 2022-12-29 PROBLEM — U07.1 POSITIVE SELF-ADMINISTERED ANTIGEN TEST FOR COVID-19: Status: RESOLVED | Noted: 2022-11-28 | Resolved: 2022-12-29

## 2022-12-29 LAB
ALBUMIN SERPL-MCNC: 4.3 G/DL (ref 3.5–5.2)
ALBUMIN/GLOB SERPL: 1.3 G/DL
ALP SERPL-CCNC: 156 U/L (ref 39–117)
ALT SERPL-CCNC: 31 U/L (ref 1–41)
AST SERPL-CCNC: 37 U/L (ref 1–40)
BASOPHILS # BLD AUTO: 0.05 10*3/MM3 (ref 0–0.2)
BASOPHILS NFR BLD AUTO: 0.7 % (ref 0–1.5)
BILIRUB SERPL-MCNC: 1 MG/DL (ref 0–1.2)
BUN SERPL-MCNC: 20 MG/DL (ref 8–23)
BUN/CREAT SERPL: 22.7 (ref 7–25)
CALCIUM SERPL-MCNC: 9.2 MG/DL (ref 8.6–10.5)
CHLORIDE SERPL-SCNC: 102 MMOL/L (ref 98–107)
CHOLEST SERPL-MCNC: 153 MG/DL (ref 0–200)
CO2 SERPL-SCNC: 27.1 MMOL/L (ref 22–29)
CREAT SERPL-MCNC: 0.88 MG/DL (ref 0.76–1.27)
EGFRCR SERPLBLD CKD-EPI 2021: 93.1 ML/MIN/1.73
EOSINOPHIL # BLD AUTO: 0.39 10*3/MM3 (ref 0–0.4)
EOSINOPHIL NFR BLD AUTO: 5.6 % (ref 0.3–6.2)
ERYTHROCYTE [DISTWIDTH] IN BLOOD BY AUTOMATED COUNT: 12.2 % (ref 12.3–15.4)
GLOBULIN SER CALC-MCNC: 3.2 GM/DL
GLUCOSE SERPL-MCNC: 100 MG/DL (ref 65–99)
HCT VFR BLD AUTO: 43.2 % (ref 37.5–51)
HDLC SERPL-MCNC: 41 MG/DL (ref 40–60)
HGB BLD-MCNC: 14.4 G/DL (ref 13–17.7)
IMM GRANULOCYTES # BLD AUTO: 0.02 10*3/MM3 (ref 0–0.05)
IMM GRANULOCYTES NFR BLD AUTO: 0.3 % (ref 0–0.5)
LDLC SERPL CALC-MCNC: 96 MG/DL (ref 0–100)
LYMPHOCYTES # BLD AUTO: 1.69 10*3/MM3 (ref 0.7–3.1)
LYMPHOCYTES NFR BLD AUTO: 24.2 % (ref 19.6–45.3)
MCH RBC QN AUTO: 30.5 PG (ref 26.6–33)
MCHC RBC AUTO-ENTMCNC: 33.3 G/DL (ref 31.5–35.7)
MCV RBC AUTO: 91.5 FL (ref 79–97)
MONOCYTES # BLD AUTO: 0.46 10*3/MM3 (ref 0.1–0.9)
MONOCYTES NFR BLD AUTO: 6.6 % (ref 5–12)
NEUTROPHILS # BLD AUTO: 4.36 10*3/MM3 (ref 1.7–7)
NEUTROPHILS NFR BLD AUTO: 62.6 % (ref 42.7–76)
NRBC BLD AUTO-RTO: 0 /100 WBC (ref 0–0.2)
PLATELET # BLD AUTO: 230 10*3/MM3 (ref 140–450)
POTASSIUM SERPL-SCNC: 4.6 MMOL/L (ref 3.5–5.2)
PROT SERPL-MCNC: 7.5 G/DL (ref 6–8.5)
RBC # BLD AUTO: 4.72 10*6/MM3 (ref 4.14–5.8)
SODIUM SERPL-SCNC: 137 MMOL/L (ref 136–145)
TRIGL SERPL-MCNC: 84 MG/DL (ref 0–150)
VLDLC SERPL CALC-MCNC: 16 MG/DL (ref 5–40)
WBC # BLD AUTO: 6.97 10*3/MM3 (ref 3.4–10.8)

## 2022-12-29 PROCEDURE — 99214 OFFICE O/P EST MOD 30 MIN: CPT | Performed by: NURSE PRACTITIONER

## 2022-12-29 PROCEDURE — 1159F MED LIST DOCD IN RCRD: CPT | Performed by: NURSE PRACTITIONER

## 2022-12-29 PROCEDURE — G0008 ADMIN INFLUENZA VIRUS VAC: HCPCS | Performed by: NURSE PRACTITIONER

## 2022-12-29 PROCEDURE — 90662 IIV NO PRSV INCREASED AG IM: CPT | Performed by: NURSE PRACTITIONER

## 2022-12-29 PROCEDURE — 1170F FXNL STATUS ASSESSED: CPT | Performed by: NURSE PRACTITIONER

## 2022-12-29 PROCEDURE — G0439 PPPS, SUBSEQ VISIT: HCPCS | Performed by: NURSE PRACTITIONER

## 2022-12-29 NOTE — PROGRESS NOTES
I N T E R N A L  M E D I C I N E  ZOE Jhaveri      ENCOUNTER DATE:  12/29/2022    Miles Pierre / 67 y.o. / male        MEDICARE ANNUAL WELLNESS VISIT       Chief Complaint: Medicare Wellness-subsequent       Patient's general assessment of his health since a year ago:     - Compared to one year ago, he feels his physical health is slightly worse.    - Compared to one year ago, he feels his mental health is about the same without significant change.      HPI for other active medical problems:     Recently treated for COVID 19 states feeling much better.  has had last Oncology treatment and has one more follow up with Dr Rashid scheduled.  having arthritis in his knees and has recently seen Physical therapy for electric stimulation and doing exercises. Hypertension controlled with Amlodipine 5 mg daily and Valsartan 80 mg daily.  continues to limit alcohol and limit fats in his diet.           HISTORY       Recent Hospitalizations:    Recent hospitalization?: None    If YES, location, date, and diagnoses:     · Location:   · Date:   · Principle Discharge Dx:   · Secondary Dx:       Patient Care Team:    Patient Care Team:  Estee Weinstein APRN as PCP - General (Family Medicine)  Rowena Motley APRN as Referring Physician (Internal Medicine)  Gillian Moran MD as Consulting Physician (Hematology and Oncology)  Corin Tuttle MD (Surgical Oncology)      Allergies:  Patient has no known allergies.    Medications:  Current Outpatient Medications on File Prior to Visit   Medication Sig Dispense Refill   • amLODIPine (NORVASC) 5 MG tablet Take 1 tablet by mouth Daily for 90 days. 90 tablet 0   • Pembrolizumab (KEYTRUDA IV) Infuse  into a venous catheter. Every 3 weeks     • valsartan (DIOVAN) 80 MG tablet TAKE ONE TABLET BY MOUTH DAILY 90 tablet 0   • [DISCONTINUED] hydrOXYzine (ATARAX) 25 MG tablet   1 Tab, Oral, QID, PRN AS NEEDED FOR ITCHING, # 120 Tab, 4 Refill(s),  [facility]: [facility], cm, 07/19/22 9:31:00 EDT, CLINICALHEIGHT, 100.06, kg, 07/19/22 9:31:00 EDT, CLINICALWEIGHT, 190.5     • hydrOXYzine (ATARAX) 25 MG tablet Take 25 mg by mouth 3 (Three) Times a Day As Needed for Itching.       No current facility-administered medications on file prior to visit.        PFSH:     The following portions of the patient's history were reviewed and updated as appropriate: Allergies / Current Medications / Past Medical History / Surgical History / Social History / Family History    Problem List:  Patient Active Problem List   Diagnosis   • Impaired fasting glucose   • Essential hypertension   • Hyperlipidemia   • Impotence of organic origin   • Osteoarthritis of knee   • Tear of medial meniscus of knee   • Obesity (BMI 30-39.9)   • Routine health maintenance   • Nocturia   • Abnormal laboratory test   • Other sleep apnea   • Lower GI bleed   • Abnormal transaminases   • Rectal bleed   • Fatty liver   • Malignant melanoma (HCC)   • 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)   • Malignant neoplasm metastatic to lymph nodes (HCC)   • Positive self-administered antigen test for COVID-19       Past Medical History:  Past Medical History:   Diagnosis Date   • Anxiety    • Cirrhosis (HCC)    • Depression    • ED (erectile dysfunction)    • Hyperlipidemia    • Hypertension    • Metastatic melanoma (HCC)    • Osteoarthritis     Knee   • Skin cancer 05/2017    Basal cell carcinoma       Past Surgical History:  Past Surgical History:   Procedure Laterality Date   • COLONOSCOPY     • COLONOSCOPY N/A 3/15/2019    Procedure: COLONOSCOPY to cecum:  cold snare polypectomies;  Surgeon: Sunil Farr MD;  Location: [facility] ENDOSCOPY;  Service: Gastroenterology   • SKIN CANCER EXCISION      basal cell   • US GUIDED LYMPH NODE BIOPSY  12/9/2019       Social History:  Social History     Socioeconomic History   • Marital status: Single   • Number of children: 3   • Years of education:  "High school   Tobacco Use   • Smoking status: Some Days     Packs/day: 0.50     Years: 23.00     Pack years: 11.50     Types: Cigars, Cigarettes     Start date:      Last attempt to quit:      Years since quittin.0   • Smokeless tobacco: Never   Substance and Sexual Activity   • Alcohol use: Yes     Comment: seldom   • Drug use: No   • Sexual activity: Never       Family History:  Family History   Problem Relation Age of Onset   • Hypertension Father    • Hypertension Brother    • Cancer Brother         Liver   • Liver disease Brother    • Cancer Brother         Bladder   • Colon cancer Neg Hx    • Colon polyps Neg Hx          PATIENT ASSESSMENT     Vitals:  Vitals:    22 0931   BP: 124/72   BP Location: Left arm   Patient Position: Sitting   Cuff Size: Adult   Pulse: 69   Temp: 98.2 °F (36.8 °C)   TempSrc: Temporal   SpO2: 97%   Weight: 104 kg (229 lb)   Height: 190.5 cm (75\")       BP Readings from Last 3 Encounters:   22 124/72   22 144/84   22 126/72     Wt Readings from Last 3 Encounters:   22 104 kg (229 lb)   22 104 kg (230 lb)   22 105 kg (230 lb 12.8 oz)      Body mass index is 28.62 kg/m².    Blood pressure readings recorded on patient flowsheet:         Review of Systems:    Review of Systems   Constitutional: Negative.    HENT: Negative.    Eyes: Negative.         Wears glasses   Respiratory: Negative.    Cardiovascular: Negative.    Gastrointestinal: Negative.    Genitourinary: Negative.    Musculoskeletal: Positive for arthralgias.   Skin: Negative.    Neurological: Negative.    Psychiatric/Behavioral: Negative.        Physical Exam:    Physical Exam  Constitutional:       Appearance: Normal appearance.   HENT:      Head: Normocephalic.      Right Ear: Tympanic membrane and ear canal normal.      Left Ear: Tympanic membrane and ear canal normal.      Nose: Nose normal.      Mouth/Throat:      Mouth: Mucous membranes are moist.      Pharynx: " Oropharynx is clear.   Eyes:      Extraocular Movements: Extraocular movements intact.      Conjunctiva/sclera: Conjunctivae normal.   Cardiovascular:      Rate and Rhythm: Normal rate and regular rhythm.      Pulses: Normal pulses.      Heart sounds: Normal heart sounds.   Pulmonary:      Effort: Pulmonary effort is normal.      Breath sounds: Normal breath sounds.   Abdominal:      General: Bowel sounds are normal.      Palpations: Abdomen is soft.   Musculoskeletal:         General: Tenderness present. Normal range of motion.      Cervical back: Normal range of motion.      Comments: Osteoarthritis in bilateral knees and followed by PT   Skin:     General: Skin is warm and dry.      Capillary Refill: Capillary refill takes less than 2 seconds.   Neurological:      Mental Status: He is alert and oriented to person, place, and time.       Reviewed Data:    Labs:   Lab Results   Component Value Date     04/29/2021    K 4.7 04/29/2021    CALCIUM 9.7 04/29/2021    AST 40 04/29/2021    ALT 44 (H) 04/29/2021    BUN 19 04/29/2021    CREATININE 0.93 04/29/2021    CREATININE 0.60 01/13/2020    CREATININE 0.66 (L) 12/16/2019    EGFRIFNONA 81 04/29/2021    EGFRIFAFRI 98 04/29/2021       Lab Results   Component Value Date    HGBA1C 6.0 (H) 11/04/2021    HGBA1C 5.80 (H) 04/29/2021    HGBA1C 5.60 06/22/2020       Lab Results   Component Value Date     (H) 11/04/2021     (H) 04/29/2021     (H) 08/26/2019    HDL 55 11/04/2021    TRIG 94 11/04/2021    CHOLHDLRATIO 3.4 11/04/2021       Lab Results   Component Value Date    TSH 2.560 11/08/2019    FREET4 0.83 12/06/2022          Lab Results   Component Value Date    WBC 6.15 04/29/2021    HGB 14.3 04/29/2021    HGB 12.2 (L) 12/16/2019    HGB 13.1 11/08/2019     04/29/2021                   Lab Results   Component Value Date    PSA 1.2 11/04/2021    PSA 0.791 06/22/2020    PSA 1.010 04/29/2019       Imaging:          Medical Tests:          Screening  for Glaucoma:  Previous screening for glaucoma?: Yes      Hearing Loss Screen:  Finger Rub Hearing Test (right ear): passed  Finger Rub Hearing Test (left ear): passed      Urinary Incontinence Screen:  Episodes of urinary incontinence? : No      Depression Screen:  PHQ-2/PHQ-9 Depression Screening 12/29/2022   Retired PHQ-9 Total Score -   Retired Total Score -   Little Interest or Pleasure in Doing Things 0-->not at all   Feeling Down, Depressed or Hopeless 0-->not at all   PHQ-9: Brief Depression Severity Measure Score 0        PHQ-2: 0 (Not depressed)    PHQ-9: 0 (Negative screening for depression)       FUNCTIONAL, FALL RISK, & COGNITIVE SCREENING (Components below):    DATA:    Functional & Cognitive Status 12/29/2022   Do you have difficulty preparing food and eating? No   Do you have difficulty bathing yourself, getting dressed or grooming yourself? No   Do you have difficulty using the toilet? No   Do you have difficulty moving around from place to place? No   Do you have trouble with steps or getting out of a bed or a chair? Yes   Current Diet Well Balanced Diet   Dental Exam Up to date   Eye Exam Up to date   Exercise (times per week) 3 times per week   Current Exercises Include Walking   Do you need help using the phone?  No   Are you deaf or do you have serious difficulty hearing?  No   Do you need help with transportation? No   Do you need help shopping? No   Do you need help preparing meals?  No   Do you need help with housework?  No   Do you need help with laundry? No   Do you need help taking your medications? No   Do you need help managing money? No   Do you ever drive or ride in a car without wearing a seat belt? No   Have you felt unusual stress, anger or loneliness in the last month? No   Who do you live with? Alone   If you need help, do you have trouble finding someone available to you? No   Have you been bothered in the last four weeks by sexual problems? No   Do you have difficulty  concentrating, remembering or making decisions? No         A) Assessment of Functional Ability:  (Assessment of ability to perform ADL's (showering/bathing, using toilet, dressing, feeding self, moving self around) and IADL's (use telephone, shop, prepare food, housekeep, do laundry, transport independently, take medications independently, and handle finances)    Degree of functional impairment: NONE (based on assessment noted above)      B) Assessment of Fall Risk:  Fall Risk Assessment was completed, and patient is at low risk for falls.       Need for further evaluation of gait, strength, and balance? : No    Timed Up and Go (TUG):   (>= 12 seconds indicates high risk for falling)    Observable abnormalities included: Normal gait pattern       C. Assessment of Cognitive Function:    Mini-Cog Test:     1) Registration (3 objects): Yes   2) Number of objects recalled: 2   3) Clock Draw: Passed? : Yes       Further evaluation required? : No        COUNSELING       A. Identification of Health Risk Factors:    Risk factors include: cardiovascular risk factors, inadequate nutrition and chronic pain      B. Age-Appropriate Screening Schedule:  (Refer to the list below for future screening recommendations based on patient's age, sex and/or medical conditions. Orders for these recommended tests are listed in the plan section. The patient has been provided with a written plan)    Health Maintenance Topics  Health Maintenance   Topic Date Due   • ZOSTER VACCINE (1 of 2) Never done   • INFLUENZA VACCINE  08/01/2022   • LIPID PANEL  11/04/2022   • TDAP/TD VACCINES (2 - Td or Tdap) 08/29/2026       Health Maintenance Topics Due or Over-Due  Health Maintenance Due   Topic Date Due   • ZOSTER VACCINE (1 of 2) Never done   • Hepatitis B (1 of 3 - Risk 3-dose series) Never done   • COVID-19 Vaccine (4 - Booster for Pfizer series) 10/28/2021   • INFLUENZA VACCINE  08/01/2022   • LIPID PANEL  11/04/2022         C. Advanced Care  Planning:    Advance Care Planning   ACP discussion was held with the patient during this visit. Patient has an advance directive in EMR which is still valid.        D. Patient Self-Management and Personalized Health Advice:    He has been provided with personalized counseling/information (including brochures/handouts) about:     -- optimizing diet/nutrition plans, improving exercise / conditioning, weight management, reducing risk for cardiac/vascular events with pre-existing disease and management of chronic pain with focus on minimizing use of narcotic pain medications      He has been recommended for the following preventative services which has been performed today, will be ordered today or ordered/performed on upcoming follow-up visit:     -- NUTRITION counseling provided, EXERCISE counseling provided, CARDIOVASCULAR disease risk reduction counseling performed, screening for prostate cancer discussed (discussed benefits / risks and mutually decided against screening)      E. Miscellaneous Items:    -Aspirin use counseling: Does not need ASA (and currently is not on it)    -Discussed BMI with him. The BMI is above average; BMI management plan is completed (discussed plans for weight loss)    -Reviewed use of high risk medication in the elderly: YES    -Reviewed for potential of harmful drug interactions in the elderly: YES        WRAP UP       Assessment & Plan:    1) SUBSEQUENT MEDICARE ANNUAL WELLNESS VISIT    2) OTHER MEDICAL CONDITIONS ADDRESSED TODAY:            Problem List Items Addressed This Visit        Cardiac and Vasculature    Essential hypertension - Primary    Relevant Medications    valsartan (DIOVAN) 80 MG tablet    amLODIPine (NORVASC) 5 MG tablet    Other Relevant Orders    CBC w AUTO Differential    Comprehensive metabolic panel    Hyperlipidemia    Relevant Orders    Lipid panel       Gastrointestinal Abdominal     Fatty liver       Musculoskeletal and Injuries    Osteoarthritis of knee    Other Visit Diagnoses     Screening due        Relevant Orders    PSA DIAGNOSTIC ONLY                    Orders Placed This Encounter   Procedures   • Fluzone High-Dose 65+yrs ([id number])   • Comprehensive metabolic panel   • Lipid panel   • PSA DIAGNOSTIC ONLY   • CBC w AUTO Differential       Discussion / Summary:  Administration of Influenza vaccine today. Order for PSA screening as never been obtained. States is scheduled for a follow up with his Opthalmologist. Discussion of need for Shingles vaccine and aware      Medications as of TODAY:              Current Outpatient Medications   Medication Sig Dispense Refill   • amLODIPine (NORVASC) 5 MG tablet Take 1 tablet by mouth Daily for 90 days. 90 tablet 0   • Pembrolizumab (KEYTRUDA IV) Infuse  into a venous catheter. Every 3 weeks     • valsartan (DIOVAN) 80 MG tablet TAKE ONE TABLET BY MOUTH DAILY 90 tablet 0   • hydrOXYzine (ATARAX) 25 MG tablet Take 25 mg by mouth 3 (Three) Times a Day As Needed for Itching.       No current facility-administered medications for this visit.         FOLLOW-UP:            Return in about 6 months (around 6/29/2023) for Next scheduled follow up.      After Visit Summary (AVS) including the Personalized Prevention  Plan Services (PPPS) was either printed and given to the patient at check-out today and/or sent to [facility] for review.       ZOE Jhaveri

## 2023-02-13 DIAGNOSIS — I10 ESSENTIAL HYPERTENSION: ICD-10-CM

## 2023-02-13 RX ORDER — VALSARTAN 80 MG/1
80 TABLET ORAL DAILY
Qty: 90 TABLET | Refills: 3 | Status: SHIPPED | OUTPATIENT
Start: 2023-02-13

## 2023-02-13 NOTE — TELEPHONE ENCOUNTER
Caller: GabbyMiles    Relationship: Self    Best call back number: [phone]    Requested Prescriptions:   Requested Prescriptions     Pending Prescriptions Disp Refills   • valsartan (DIOVAN) 80 MG tablet 90 tablet 0     Sig: Take 1 tablet by mouth Daily.        Pharmacy where request should be sent: [facility] [id number] [address] [facility] [phone]  - [phone] FX     Additional details provided by patient: PATIENT IS OUT OF MEDICATION    Does the patient have less than a 3 day supply:  [x] Yes  [] No    Would you like a call back once the refill request has been completed: [] Yes [x] No    If the office needs to give you a call back, can they leave a voicemail: [] Yes [x] No    Rosalie العراقي Rep   02/13/23 13:06 EST

## 2023-03-01 RX ORDER — AMLODIPINE BESYLATE 5 MG/1
TABLET ORAL
Qty: 90 TABLET | Refills: 0 | Status: SHIPPED | OUTPATIENT
Start: 2023-03-01

## 2023-05-31 RX ORDER — AMLODIPINE BESYLATE 5 MG/1
TABLET ORAL
Qty: 90 TABLET | Refills: 1 | Status: SHIPPED | OUTPATIENT
Start: 2023-05-31

## (undated) DEVICE — SNAR POLYP SENSATION STDOVL 27 240 BX40

## (undated) DEVICE — Device: Brand: DEFENDO AIR/WATER/SUCTION AND BIOPSY VALVE

## (undated) DEVICE — THE TORRENT IRRIGATION SCOPE CONNECTOR IS USED WITH THE TORRENT IRRIGATION TUBING TO PROVIDE IRRIGATION FLUIDS SUCH AS STERILE WATER DURING GASTROINTESTINAL ENDOSCOPIC PROCEDURES WHEN USED IN CONJUNCTION WITH AN IRRIGATION PUMP (OR ELECTROSURGICAL UNIT).: Brand: TORRENT

## (undated) DEVICE — THE SINGLE USE ETRAP – POLYP TRAP IS USED FOR SUCTION RETRIEVAL OF ENDOSCOPICALLY REMOVED POLYPS.: Brand: ETRAP

## (undated) DEVICE — CANNULA,ADULT,SOFT-TOUCH,7'TUBE,UC: Brand: PENDING

## (undated) DEVICE — TUBING, SUCTION, 1/4" X 10', STRAIGHT: Brand: MEDLINE